# Patient Record
Sex: FEMALE | Race: WHITE | NOT HISPANIC OR LATINO | URBAN - METROPOLITAN AREA
[De-identification: names, ages, dates, MRNs, and addresses within clinical notes are randomized per-mention and may not be internally consistent; named-entity substitution may affect disease eponyms.]

---

## 2017-11-01 ENCOUNTER — OUTPATIENT (OUTPATIENT)
Dept: OUTPATIENT SERVICES | Facility: HOSPITAL | Age: 47
LOS: 1 days | End: 2017-11-01
Payer: MEDICAID

## 2017-11-07 DIAGNOSIS — R69 ILLNESS, UNSPECIFIED: ICD-10-CM

## 2018-01-19 ENCOUNTER — EMERGENCY (EMERGENCY)
Facility: HOSPITAL | Age: 48
LOS: 1 days | Discharge: HOME | End: 2018-01-19
Attending: EMERGENCY MEDICINE | Admitting: EMERGENCY MEDICINE
Payer: MEDICAID

## 2018-01-19 VITALS
HEART RATE: 75 BPM | TEMPERATURE: 98 F | DIASTOLIC BLOOD PRESSURE: 84 MMHG | WEIGHT: 136.91 LBS | SYSTOLIC BLOOD PRESSURE: 148 MMHG | RESPIRATION RATE: 16 BRPM

## 2018-01-19 VITALS
TEMPERATURE: 98 F | OXYGEN SATURATION: 100 % | DIASTOLIC BLOOD PRESSURE: 83 MMHG | SYSTOLIC BLOOD PRESSURE: 122 MMHG | HEART RATE: 70 BPM | RESPIRATION RATE: 15 BRPM

## 2018-01-19 PROCEDURE — 99283 EMERGENCY DEPT VISIT LOW MDM: CPT

## 2018-01-19 RX ORDER — ACETAMINOPHEN 500 MG
975 TABLET ORAL ONCE
Qty: 0 | Refills: 0 | Status: COMPLETED | OUTPATIENT
Start: 2018-01-19 | End: 2018-01-19

## 2018-01-19 RX ORDER — OXYCODONE HYDROCHLORIDE 5 MG/1
5 TABLET ORAL ONCE
Qty: 0 | Refills: 0 | Status: DISCONTINUED | OUTPATIENT
Start: 2018-01-19 | End: 2018-01-19

## 2018-01-19 RX ADMIN — OXYCODONE HYDROCHLORIDE 5 MILLIGRAM(S): 5 TABLET ORAL at 12:05

## 2018-01-19 RX ADMIN — Medication 975 MILLIGRAM(S): at 12:04

## 2018-01-19 RX ADMIN — OXYCODONE HYDROCHLORIDE 5 MILLIGRAM(S): 5 TABLET ORAL at 11:08

## 2018-01-19 NOTE — ED PROVIDER NOTE - MEDICAL DECISION MAKING DETAILS
48yo F h/o lymphoma in remission s/p chemo (2011), breast CA (2013) and liposarcoma (all in remission) p/w toothache x4mo. No concerns for abscess. Will c/s dental. Anticipate d/c with f/u in dental clinic for root canal. Will address pain control in ED and f/u on dental recs.

## 2018-01-19 NOTE — ED PROVIDER NOTE - OBJECTIVE STATEMENT
48yo F h/o 48yo F h/o lymphoma in remission s/p chemo (2011), breast CA (2013) and liposarcoma (all in remission) p/w toothache x4mo. Has seen multiple dentists and oral surgeons who, per pt, have refused to do root canal/tooth extraction because of pt's h/o chemo. Has been f/u by multiple PCPs/oncologists for f/u since 2013 and all BW normal. Denies f/c, HA, visual changes. Only reporting pain in R cheek, upper molar. Reports dentists told her she needs root canal. Takes tylenol for pain. Uses topical anesthetic on tooth for pain. Doesn't take nsaids 2/2 gastritis.

## 2018-01-19 NOTE — ED ADULT NURSE NOTE - OBJECTIVE STATEMENT
48 y/o female presenting to Ed c/o right side tooth pain for 4 months.  Pt states she has pmhx of lymphoma, andher oral surgeon sent her here to have teeth removed since she is "too high risk". At this time pt. complains of sharp pain. Safety and comfort measures maintained. Patient undressed and placed into gown, call bell in hand and side rails up for safety. warm blanket provided, vital signs stable, pt in no acute distress.

## 2018-01-19 NOTE — ED PROVIDER NOTE - NS ED ROS FT
Constitutional: no fevers, chills  HEENT: +toothache, no visual changes, no sore throat, no rhinorrhea  CV: no cp  Resp: no sob  GI: no abd pain, n/v, diarrhea/constipation  : no dysuria, hematuria  MSK: no joint pains  skin: no rashes  neuro: no HA, no confusion  psych: no SI/HI

## 2018-01-19 NOTE — ED PROVIDER NOTE - ATTENDING CONTRIBUTION TO CARE
attending Norma: 47yF h/o lymphoma in remission s/p chemo (2011), breast CA (2013) and liposarcoma (all in remission) p/w toothache x4mo. Reportedly seen multiple dentists and oral surgeons who, per pt, have refused to do root canal/tooth extraction because of pt's prior chemo. Denies f/c, HA, visual changes. Only reporting pain in R cheek, upper molar. No relief with tylenol. On exam, uncomfortable 2/2 pain, afebrile, tenderness to R upper molar, no gingival abscess. Will administer pain control, dental consult and reassess.

## 2018-01-19 NOTE — ED PROVIDER NOTE - PHYSICAL EXAMINATION
Vitals: WNL  Gen: sitting uncomfortably in pain  HEENT: NCAT, sclerae white, anicterus, moist mucous membranes. poor dentition with multiple missing teeth and fillings, no abscesses in oral cavity, L upper gums ttp, 3rd tooth ttp  CV: RRR. Audible S1 and S2. No murmurs, rubs, gallops, S3, nor S4, 2+ radial and DP pulses   Pulm: Clear to auscultation bilaterally. No wheezes, rales, or rhonchi  Abd: soft, normoactive BS x4, NTND, no rebound, no guarding, no rashes  Musculoskeletal:  No peripheral edema  Skin: no lesions or scars noted  Neurologic: AAOx3  : no CVA tenderness

## 2018-01-19 NOTE — ED ADULT NURSE NOTE - PMH
Anxiety Associated with Depression    Diffuse large B cell lymphoma  diagnosed 2010, with spleen and liver involvement, currenlty in remission  Diverticulosis    GERD (gastroesophageal reflux disease)    Hiatal hernia    History of cancer chemotherapy    Hypertension    Migraine with aura

## 2018-02-15 ENCOUNTER — OUTPATIENT (OUTPATIENT)
Dept: OUTPATIENT SERVICES | Facility: HOSPITAL | Age: 48
LOS: 1 days | End: 2018-02-15
Payer: MEDICAID

## 2018-02-15 ENCOUNTER — APPOINTMENT (OUTPATIENT)
Dept: RADIOLOGY | Facility: IMAGING CENTER | Age: 48
End: 2018-02-15

## 2018-02-15 DIAGNOSIS — Z00.8 ENCOUNTER FOR OTHER GENERAL EXAMINATION: ICD-10-CM

## 2018-02-15 PROCEDURE — 72110 X-RAY EXAM L-2 SPINE 4/>VWS: CPT

## 2018-02-15 PROCEDURE — 72110 X-RAY EXAM L-2 SPINE 4/>VWS: CPT | Mod: 26

## 2018-05-07 ENCOUNTER — EMERGENCY (EMERGENCY)
Facility: HOSPITAL | Age: 48
LOS: 1 days | Discharge: HOME | End: 2018-05-07
Attending: EMERGENCY MEDICINE
Payer: MEDICAID

## 2018-05-07 VITALS
SYSTOLIC BLOOD PRESSURE: 133 MMHG | RESPIRATION RATE: 18 BRPM | HEART RATE: 75 BPM | WEIGHT: 130.07 LBS | OXYGEN SATURATION: 98 % | DIASTOLIC BLOOD PRESSURE: 86 MMHG | TEMPERATURE: 98 F

## 2018-05-07 LAB
ALBUMIN SERPL ELPH-MCNC: 4.4 G/DL — SIGNIFICANT CHANGE UP (ref 3.3–5)
ALP SERPL-CCNC: 77 U/L — SIGNIFICANT CHANGE UP (ref 40–120)
ALT FLD-CCNC: 22 U/L — SIGNIFICANT CHANGE UP (ref 10–45)
ANION GAP SERPL CALC-SCNC: 16 MMOL/L — SIGNIFICANT CHANGE UP (ref 5–17)
AST SERPL-CCNC: 28 U/L — SIGNIFICANT CHANGE UP (ref 10–40)
BASOPHILS # BLD AUTO: 0.1 K/UL — SIGNIFICANT CHANGE UP (ref 0–0.2)
BASOPHILS NFR BLD AUTO: 0.6 % — SIGNIFICANT CHANGE UP (ref 0–2)
BILIRUB SERPL-MCNC: 0.4 MG/DL — SIGNIFICANT CHANGE UP (ref 0.2–1.2)
BUN SERPL-MCNC: 27 MG/DL — HIGH (ref 7–23)
CALCIUM SERPL-MCNC: 9.5 MG/DL — SIGNIFICANT CHANGE UP (ref 8.4–10.5)
CHLORIDE SERPL-SCNC: 102 MMOL/L — SIGNIFICANT CHANGE UP (ref 96–108)
CO2 SERPL-SCNC: 22 MMOL/L — SIGNIFICANT CHANGE UP (ref 22–31)
CREAT SERPL-MCNC: 0.84 MG/DL — SIGNIFICANT CHANGE UP (ref 0.5–1.3)
EOSINOPHIL # BLD AUTO: 0.1 K/UL — SIGNIFICANT CHANGE UP (ref 0–0.5)
EOSINOPHIL NFR BLD AUTO: 0.7 % — SIGNIFICANT CHANGE UP (ref 0–6)
GAS PNL BLDV: SIGNIFICANT CHANGE UP
GLUCOSE SERPL-MCNC: 78 MG/DL — SIGNIFICANT CHANGE UP (ref 70–99)
HCG SERPL QL: NEGATIVE — SIGNIFICANT CHANGE UP
HCT VFR BLD CALC: 40.1 % — SIGNIFICANT CHANGE UP (ref 34.5–45)
HGB BLD-MCNC: 13.2 G/DL — SIGNIFICANT CHANGE UP (ref 11.5–15.5)
LIDOCAIN IGE QN: 14 U/L — SIGNIFICANT CHANGE UP (ref 7–60)
LYMPHOCYTES # BLD AUTO: 2.6 K/UL — SIGNIFICANT CHANGE UP (ref 1–3.3)
LYMPHOCYTES # BLD AUTO: 25.3 % — SIGNIFICANT CHANGE UP (ref 13–44)
MCHC RBC-ENTMCNC: 28.2 PG — SIGNIFICANT CHANGE UP (ref 27–34)
MCHC RBC-ENTMCNC: 33 GM/DL — SIGNIFICANT CHANGE UP (ref 32–36)
MCV RBC AUTO: 85.4 FL — SIGNIFICANT CHANGE UP (ref 80–100)
MONOCYTES # BLD AUTO: 0.7 K/UL — SIGNIFICANT CHANGE UP (ref 0–0.9)
MONOCYTES NFR BLD AUTO: 7.3 % — SIGNIFICANT CHANGE UP (ref 2–14)
NEUTROPHILS # BLD AUTO: 6.7 K/UL — SIGNIFICANT CHANGE UP (ref 1.8–7.4)
NEUTROPHILS NFR BLD AUTO: 66.2 % — SIGNIFICANT CHANGE UP (ref 43–77)
PLATELET # BLD AUTO: 275 K/UL — SIGNIFICANT CHANGE UP (ref 150–400)
POTASSIUM SERPL-MCNC: 3.8 MMOL/L — SIGNIFICANT CHANGE UP (ref 3.5–5.3)
POTASSIUM SERPL-SCNC: 3.8 MMOL/L — SIGNIFICANT CHANGE UP (ref 3.5–5.3)
PROT SERPL-MCNC: 7.9 G/DL — SIGNIFICANT CHANGE UP (ref 6–8.3)
RBC # BLD: 4.69 M/UL — SIGNIFICANT CHANGE UP (ref 3.8–5.2)
RBC # FLD: 16.6 % — HIGH (ref 10.3–14.5)
SODIUM SERPL-SCNC: 140 MMOL/L — SIGNIFICANT CHANGE UP (ref 135–145)
WBC # BLD: 10.1 K/UL — SIGNIFICANT CHANGE UP (ref 3.8–10.5)
WBC # FLD AUTO: 10.1 K/UL — SIGNIFICANT CHANGE UP (ref 3.8–10.5)

## 2018-05-07 PROCEDURE — 99284 EMERGENCY DEPT VISIT MOD MDM: CPT

## 2018-05-07 RX ORDER — SODIUM CHLORIDE 9 MG/ML
1000 INJECTION INTRAMUSCULAR; INTRAVENOUS; SUBCUTANEOUS ONCE
Refills: 0 | Status: COMPLETED | OUTPATIENT
Start: 2018-05-07 | End: 2018-05-07

## 2018-05-07 RX ORDER — ONDANSETRON 8 MG/1
4 TABLET, FILM COATED ORAL ONCE
Refills: 0 | Status: COMPLETED | OUTPATIENT
Start: 2018-05-07 | End: 2018-05-07

## 2018-05-07 RX ORDER — FAMOTIDINE 10 MG/ML
20 INJECTION INTRAVENOUS ONCE
Refills: 0 | Status: COMPLETED | OUTPATIENT
Start: 2018-05-07 | End: 2018-05-07

## 2018-05-07 RX ADMIN — FAMOTIDINE 20 MILLIGRAM(S): 10 INJECTION INTRAVENOUS at 19:17

## 2018-05-07 RX ADMIN — ONDANSETRON 4 MILLIGRAM(S): 8 TABLET, FILM COATED ORAL at 19:17

## 2018-05-07 RX ADMIN — SODIUM CHLORIDE 1000 MILLILITER(S): 9 INJECTION INTRAMUSCULAR; INTRAVENOUS; SUBCUTANEOUS at 19:17

## 2018-05-07 NOTE — ED PROVIDER NOTE - ATTENDING CONTRIBUTION TO CARE
Patient seen with both the scribe and resident. Presenting with a constellation of complaints just like those she has been experiencing for years. Physical examination benign. Screening labs obtained for objective measure while providing medication in an effort to promote symptomatic improvement/relief and a PO challenge. Labs unremarkable. Tolerated PO. There is nothing clinically evident to suggest any acute or emergent process; e.g., obstruction, perforation, ischemia, Ao catastrophe, torsion, "-itis" in need of imaging or extra-abdominal issue, be it supra-diaphragmatic or retroperitoneal. There is no clinical indication for any emergent radiographic investigation The patient has been discharged with appropriate instruction and follow-up.

## 2018-05-07 NOTE — ED ADULT NURSE NOTE - OBJECTIVE STATEMENT
Pt is a 46 yo female with the co N/V since saturday. Pt states that she went out to dinner with her  on saturday and shortly after she began to feel nausea and felt a burning sensation in the epigastric area, Pt denies being able to tolerate anything since. She denies any CP or SOB no cough fever or chills. Pt has a pmh of GERD, hiatal hernia, sarcoma, carcinoma (right leg), lymphoma, PSHx of mastectomy

## 2018-05-07 NOTE — ED PROVIDER NOTE - CARE PLAN
Principal Discharge DX:	Abdominal pain  Assessment and plan of treatment:	You have abdominal pain, that you have had off-and-on and does not appear to be different. The primary source is likely your chronic gastritis and reflux irritation. Your labs do not show anything more serious, and your pain improved with fluids and medicines to decrease acid. There is no sign of anemia. Please follow up with your Primary Care Provider (PCP) this week to monitor your symptoms.

## 2018-05-07 NOTE — ED PROVIDER NOTE - OBJECTIVE STATEMENT
48 y/o F pt with PMHx of GERD, hiatal hernia, sarcoma, carcinoma (right leg), lymphoma, PSHx of mastectomy c/o intermittent nausea vomiting with associated abd pain x3 days. States that sx started with nausea and then noted abd pain due to vomiting. Also notes decreased eating/drinking. States that she has multiple episodes of vomiting fits but still haven't been able to Dx. Pt has hx of endoscopy with no findings. Current medication; Zofran, Prilosec Patient was previously under different name: Bailee Og  46 y/o F pt with PMHx of GERD, hiatal hernia, sarcoma, carcinoma (right leg), chronic hemorrhoids, lymphoma, PSHx of mastectomy c/o intermittent nausea vomiting with associated abd pain x3 days. States that sx started with nausea and then noted abd pain due to vomiting. Also notes decreased eating/drinking. Sxs started after drinking 3 glasses of wine Saturday night. States that she has multiple episodes of vomiting fits but still haven't been able to Dx. Pt has hx of endoscopy, capsule, and colonoscopy with no findings. Chronic BRBPR from hemorrhoids, unchanged. Current medication; Zofran, Prilosec. Current 1/2 ppd smoker, formerly 2 ppd.

## 2018-05-07 NOTE — ED PROVIDER NOTE - MEDICAL DECISION MAKING DETAILS
46 y/o F pt with PMHx of GERD, hiatal hernia, sarcoma, carcinoma (right leg), chronic hemorrhoids, lymphoma, PSHx of mastectomy c/o intermittent nausea vomiting with associated abd pain x3 days. 48 y/o F pt with PMHx of GERD, hiatal hernia, sarcoma, carcinoma (right leg), chronic hemorrhoids, lymphoma, PSHx of mastectomy c/o intermittent nausea vomiting with associated abd pain x3 days. Patient's symptoms are not different from prior admissions, nontoxic appearing with benign abdominal exam, no concern for infection. Pt has h/o gastritis, will give Pepcid. Ddx includes pregnancy, LMP 2 months ago. Antiemetics, IVFs, CBC, check lytes, lactate. No urinary complaints. MAYRA.

## 2018-05-07 NOTE — ED ADULT NURSE REASSESSMENT NOTE - NS ED NURSE REASSESS COMMENT FT1
Pt says she is feeling better. Decreased nausea and abdominal pain. Able to tolerate PO. Will reassess shortly for DC.

## 2018-05-07 NOTE — ED PROVIDER NOTE - PLAN OF CARE
You have abdominal pain, that you have had off-and-on and does not appear to be different. The primary source is likely your chronic gastritis and reflux irritation. Your labs do not show anything more serious, and your pain improved with fluids and medicines to decrease acid. There is no sign of anemia. Please follow up with your Primary Care Provider (PCP) this week to monitor your symptoms.

## 2018-05-07 NOTE — ED PROVIDER NOTE - PROGRESS NOTE DETAILS
Patient feeling much better after IVF, Zofran, Pepcid. Still waiting to urinate. If UA/preg nml, can be d'cd. Patient feeling much better after IVF, Zofran, Pepcid. Tolerating PO challenge, can be dcd.

## 2018-05-08 DIAGNOSIS — Z90.13 ACQUIRED ABSENCE OF BILATERAL BREASTS AND NIPPLES: Chronic | ICD-10-CM

## 2018-05-31 ENCOUNTER — TRANSCRIPTION ENCOUNTER (OUTPATIENT)
Age: 48
End: 2018-05-31

## 2018-06-01 ENCOUNTER — INPATIENT (INPATIENT)
Facility: HOSPITAL | Age: 48
LOS: 1 days | Discharge: HOME | DRG: 343 | End: 2018-06-03
Attending: SURGERY | Admitting: SURGERY
Payer: MEDICAID

## 2018-06-01 ENCOUNTER — RESULT REVIEW (OUTPATIENT)
Age: 48
End: 2018-06-01

## 2018-06-01 VITALS
RESPIRATION RATE: 18 BRPM | SYSTOLIC BLOOD PRESSURE: 116 MMHG | HEART RATE: 99 BPM | DIASTOLIC BLOOD PRESSURE: 64 MMHG | WEIGHT: 147.93 LBS | OXYGEN SATURATION: 98 % | TEMPERATURE: 98 F

## 2018-06-01 DIAGNOSIS — Z90.13 ACQUIRED ABSENCE OF BILATERAL BREASTS AND NIPPLES: Chronic | ICD-10-CM

## 2018-06-01 DIAGNOSIS — K35.80 UNSPECIFIED ACUTE APPENDICITIS: ICD-10-CM

## 2018-06-01 DIAGNOSIS — Z85.830 PERSONAL HISTORY OF MALIGNANT NEOPLASM OF BONE: Chronic | ICD-10-CM

## 2018-06-01 LAB
ALBUMIN SERPL ELPH-MCNC: 4 G/DL — SIGNIFICANT CHANGE UP (ref 3.3–5)
ALP SERPL-CCNC: 80 U/L — SIGNIFICANT CHANGE UP (ref 40–120)
ALT FLD-CCNC: 16 U/L — SIGNIFICANT CHANGE UP (ref 10–45)
ANION GAP SERPL CALC-SCNC: 13 MMOL/L — SIGNIFICANT CHANGE UP (ref 5–17)
APPEARANCE UR: CLEAR — SIGNIFICANT CHANGE UP
APTT BLD: 29.2 SEC — SIGNIFICANT CHANGE UP (ref 27.5–37.4)
AST SERPL-CCNC: 16 U/L — SIGNIFICANT CHANGE UP (ref 10–40)
BASOPHILS # BLD AUTO: 0.1 K/UL — SIGNIFICANT CHANGE UP (ref 0–0.2)
BASOPHILS NFR BLD AUTO: 0.4 % — SIGNIFICANT CHANGE UP (ref 0–2)
BILIRUB SERPL-MCNC: 0.4 MG/DL — SIGNIFICANT CHANGE UP (ref 0.2–1.2)
BILIRUB UR-MCNC: NEGATIVE — SIGNIFICANT CHANGE UP
BLD GP AB SCN SERPL QL: NEGATIVE — SIGNIFICANT CHANGE UP
BUN SERPL-MCNC: 15 MG/DL — SIGNIFICANT CHANGE UP (ref 7–23)
CALCIUM SERPL-MCNC: 9.3 MG/DL — SIGNIFICANT CHANGE UP (ref 8.4–10.5)
CHLORIDE SERPL-SCNC: 102 MMOL/L — SIGNIFICANT CHANGE UP (ref 96–108)
CO2 SERPL-SCNC: 21 MMOL/L — LOW (ref 22–31)
COLOR SPEC: COLORLESS — SIGNIFICANT CHANGE UP
CREAT SERPL-MCNC: 0.7 MG/DL — SIGNIFICANT CHANGE UP (ref 0.5–1.3)
DIFF PNL FLD: NEGATIVE — SIGNIFICANT CHANGE UP
EOSINOPHIL # BLD AUTO: 0.1 K/UL — SIGNIFICANT CHANGE UP (ref 0–0.5)
EOSINOPHIL NFR BLD AUTO: 0.9 % — SIGNIFICANT CHANGE UP (ref 0–6)
GLUCOSE SERPL-MCNC: 88 MG/DL — SIGNIFICANT CHANGE UP (ref 70–99)
GLUCOSE UR QL: NEGATIVE — SIGNIFICANT CHANGE UP
HCG SERPL-ACNC: <2 MIU/ML — SIGNIFICANT CHANGE UP (ref 5–24)
HCT VFR BLD CALC: 37.9 % — SIGNIFICANT CHANGE UP (ref 34.5–45)
HGB BLD-MCNC: 12.9 G/DL — SIGNIFICANT CHANGE UP (ref 11.5–15.5)
INR BLD: 1.16 RATIO — SIGNIFICANT CHANGE UP (ref 0.88–1.16)
KETONES UR-MCNC: NEGATIVE — SIGNIFICANT CHANGE UP
LEUKOCYTE ESTERASE UR-ACNC: NEGATIVE — SIGNIFICANT CHANGE UP
LIDOCAIN IGE QN: 18 U/L — SIGNIFICANT CHANGE UP (ref 7–60)
LYMPHOCYTES # BLD AUTO: 19.2 % — SIGNIFICANT CHANGE UP (ref 13–44)
LYMPHOCYTES # BLD AUTO: 2.4 K/UL — SIGNIFICANT CHANGE UP (ref 1–3.3)
MCHC RBC-ENTMCNC: 29.2 PG — SIGNIFICANT CHANGE UP (ref 27–34)
MCHC RBC-ENTMCNC: 34 GM/DL — SIGNIFICANT CHANGE UP (ref 32–36)
MCV RBC AUTO: 86 FL — SIGNIFICANT CHANGE UP (ref 80–100)
MONOCYTES # BLD AUTO: 0.5 K/UL — SIGNIFICANT CHANGE UP (ref 0–0.9)
MONOCYTES NFR BLD AUTO: 4.3 % — SIGNIFICANT CHANGE UP (ref 2–14)
NEUTROPHILS # BLD AUTO: 9.4 K/UL — HIGH (ref 1.8–7.4)
NEUTROPHILS NFR BLD AUTO: 75.1 % — SIGNIFICANT CHANGE UP (ref 43–77)
NITRITE UR-MCNC: NEGATIVE — SIGNIFICANT CHANGE UP
PH UR: 6.5 — SIGNIFICANT CHANGE UP (ref 5–8)
PLATELET # BLD AUTO: 240 K/UL — SIGNIFICANT CHANGE UP (ref 150–400)
POTASSIUM SERPL-MCNC: 4.1 MMOL/L — SIGNIFICANT CHANGE UP (ref 3.5–5.3)
POTASSIUM SERPL-SCNC: 4.1 MMOL/L — SIGNIFICANT CHANGE UP (ref 3.5–5.3)
PROT SERPL-MCNC: 7.1 G/DL — SIGNIFICANT CHANGE UP (ref 6–8.3)
PROT UR-MCNC: NEGATIVE — SIGNIFICANT CHANGE UP
PROTHROM AB SERPL-ACNC: 12.6 SEC — SIGNIFICANT CHANGE UP (ref 9.8–12.7)
RBC # BLD: 4.41 M/UL — SIGNIFICANT CHANGE UP (ref 3.8–5.2)
RBC # FLD: 16.4 % — HIGH (ref 10.3–14.5)
RH IG SCN BLD-IMP: POSITIVE — SIGNIFICANT CHANGE UP
SODIUM SERPL-SCNC: 136 MMOL/L — SIGNIFICANT CHANGE UP (ref 135–145)
SP GR SPEC: <1.005 — LOW (ref 1.01–1.02)
UROBILINOGEN FLD QL: NEGATIVE — SIGNIFICANT CHANGE UP
WBC # BLD: 12.6 K/UL — HIGH (ref 3.8–10.5)
WBC # FLD AUTO: 12.6 K/UL — HIGH (ref 3.8–10.5)

## 2018-06-01 PROCEDURE — 99285 EMERGENCY DEPT VISIT HI MDM: CPT

## 2018-06-01 PROCEDURE — 93010 ELECTROCARDIOGRAM REPORT: CPT

## 2018-06-01 PROCEDURE — 76830 TRANSVAGINAL US NON-OB: CPT | Mod: 26

## 2018-06-01 PROCEDURE — 88304 TISSUE EXAM BY PATHOLOGIST: CPT | Mod: 26

## 2018-06-01 PROCEDURE — 93975 VASCULAR STUDY: CPT | Mod: 26

## 2018-06-01 PROCEDURE — 74177 CT ABD & PELVIS W/CONTRAST: CPT | Mod: 26

## 2018-06-01 PROCEDURE — 44970 LAPAROSCOPY APPENDECTOMY: CPT

## 2018-06-01 PROCEDURE — 99222 1ST HOSP IP/OBS MODERATE 55: CPT | Mod: 57

## 2018-06-01 RX ORDER — MORPHINE SULFATE 50 MG/1
4 CAPSULE, EXTENDED RELEASE ORAL ONCE
Refills: 0 | Status: DISCONTINUED | OUTPATIENT
Start: 2018-06-01 | End: 2018-06-01

## 2018-06-01 RX ORDER — PIPERACILLIN AND TAZOBACTAM 4; .5 G/20ML; G/20ML
3.38 INJECTION, POWDER, LYOPHILIZED, FOR SOLUTION INTRAVENOUS ONCE
Refills: 0 | Status: COMPLETED | OUTPATIENT
Start: 2018-06-01 | End: 2018-06-01

## 2018-06-01 RX ORDER — HYDROMORPHONE HYDROCHLORIDE 2 MG/ML
0.5 INJECTION INTRAMUSCULAR; INTRAVENOUS; SUBCUTANEOUS
Refills: 0 | Status: DISCONTINUED | OUTPATIENT
Start: 2018-06-01 | End: 2018-06-02

## 2018-06-01 RX ORDER — HYDROMORPHONE HYDROCHLORIDE 2 MG/ML
1 INJECTION INTRAMUSCULAR; INTRAVENOUS; SUBCUTANEOUS ONCE
Refills: 0 | Status: DISCONTINUED | OUTPATIENT
Start: 2018-06-01 | End: 2018-06-01

## 2018-06-01 RX ORDER — MORPHINE SULFATE 50 MG/1
4 CAPSULE, EXTENDED RELEASE ORAL EVERY 4 HOURS
Refills: 0 | Status: DISCONTINUED | OUTPATIENT
Start: 2018-06-01 | End: 2018-06-01

## 2018-06-01 RX ORDER — SODIUM CHLORIDE 9 MG/ML
1000 INJECTION, SOLUTION INTRAVENOUS
Refills: 0 | Status: DISCONTINUED | OUTPATIENT
Start: 2018-06-01 | End: 2018-06-03

## 2018-06-01 RX ORDER — SODIUM CHLORIDE 9 MG/ML
1000 INJECTION INTRAMUSCULAR; INTRAVENOUS; SUBCUTANEOUS ONCE
Refills: 0 | Status: COMPLETED | OUTPATIENT
Start: 2018-06-01 | End: 2018-06-01

## 2018-06-01 RX ORDER — SODIUM CHLORIDE 9 MG/ML
1000 INJECTION, SOLUTION INTRAVENOUS
Refills: 0 | Status: DISCONTINUED | OUTPATIENT
Start: 2018-06-01 | End: 2018-06-01

## 2018-06-01 RX ORDER — ONDANSETRON 8 MG/1
4 TABLET, FILM COATED ORAL ONCE
Refills: 0 | Status: DISCONTINUED | OUTPATIENT
Start: 2018-06-01 | End: 2018-06-02

## 2018-06-01 RX ORDER — DIPHENHYDRAMINE HCL 50 MG
25 CAPSULE ORAL EVERY 4 HOURS
Refills: 0 | Status: DISCONTINUED | OUTPATIENT
Start: 2018-06-01 | End: 2018-06-01

## 2018-06-01 RX ORDER — MORPHINE SULFATE 50 MG/1
2 CAPSULE, EXTENDED RELEASE ORAL EVERY 4 HOURS
Refills: 0 | Status: DISCONTINUED | OUTPATIENT
Start: 2018-06-01 | End: 2018-06-01

## 2018-06-01 RX ORDER — OXYCODONE AND ACETAMINOPHEN 5; 325 MG/1; MG/1
1 TABLET ORAL EVERY 4 HOURS
Refills: 0 | Status: DISCONTINUED | OUTPATIENT
Start: 2018-06-01 | End: 2018-06-03

## 2018-06-01 RX ORDER — DIPHENHYDRAMINE HCL 50 MG
25 CAPSULE ORAL ONCE
Refills: 0 | Status: COMPLETED | OUTPATIENT
Start: 2018-06-01 | End: 2018-06-01

## 2018-06-01 RX ORDER — DOCUSATE SODIUM 100 MG
100 CAPSULE ORAL THREE TIMES A DAY
Refills: 0 | Status: DISCONTINUED | OUTPATIENT
Start: 2018-06-01 | End: 2018-06-03

## 2018-06-01 RX ORDER — ONDANSETRON 8 MG/1
4 TABLET, FILM COATED ORAL ONCE
Refills: 0 | Status: COMPLETED | OUTPATIENT
Start: 2018-06-01 | End: 2018-06-01

## 2018-06-01 RX ORDER — OXYCODONE AND ACETAMINOPHEN 5; 325 MG/1; MG/1
2 TABLET ORAL EVERY 6 HOURS
Refills: 0 | Status: DISCONTINUED | OUTPATIENT
Start: 2018-06-01 | End: 2018-06-03

## 2018-06-01 RX ORDER — PIPERACILLIN AND TAZOBACTAM 4; .5 G/20ML; G/20ML
3.38 INJECTION, POWDER, LYOPHILIZED, FOR SOLUTION INTRAVENOUS EVERY 8 HOURS
Refills: 0 | Status: DISCONTINUED | OUTPATIENT
Start: 2018-06-01 | End: 2018-06-01

## 2018-06-01 RX ORDER — MORPHINE SULFATE 50 MG/1
2 CAPSULE, EXTENDED RELEASE ORAL EVERY 6 HOURS
Refills: 0 | Status: DISCONTINUED | OUTPATIENT
Start: 2018-06-01 | End: 2018-06-03

## 2018-06-01 RX ORDER — FAMOTIDINE 10 MG/ML
20 INJECTION INTRAVENOUS ONCE
Refills: 0 | Status: COMPLETED | OUTPATIENT
Start: 2018-06-01 | End: 2018-06-01

## 2018-06-01 RX ADMIN — HYDROMORPHONE HYDROCHLORIDE 0.5 MILLIGRAM(S): 2 INJECTION INTRAMUSCULAR; INTRAVENOUS; SUBCUTANEOUS at 23:51

## 2018-06-01 RX ADMIN — SODIUM CHLORIDE 1000 MILLILITER(S): 9 INJECTION INTRAMUSCULAR; INTRAVENOUS; SUBCUTANEOUS at 21:07

## 2018-06-01 RX ADMIN — PIPERACILLIN AND TAZOBACTAM 200 GRAM(S): 4; .5 INJECTION, POWDER, LYOPHILIZED, FOR SOLUTION INTRAVENOUS at 15:34

## 2018-06-01 RX ADMIN — SODIUM CHLORIDE 1000 MILLILITER(S): 9 INJECTION INTRAMUSCULAR; INTRAVENOUS; SUBCUTANEOUS at 13:47

## 2018-06-01 RX ADMIN — ONDANSETRON 4 MILLIGRAM(S): 8 TABLET, FILM COATED ORAL at 13:46

## 2018-06-01 RX ADMIN — HYDROMORPHONE HYDROCHLORIDE 1 MILLIGRAM(S): 2 INJECTION INTRAMUSCULAR; INTRAVENOUS; SUBCUTANEOUS at 20:11

## 2018-06-01 RX ADMIN — MORPHINE SULFATE 4 MILLIGRAM(S): 50 CAPSULE, EXTENDED RELEASE ORAL at 15:41

## 2018-06-01 RX ADMIN — MORPHINE SULFATE 4 MILLIGRAM(S): 50 CAPSULE, EXTENDED RELEASE ORAL at 13:46

## 2018-06-01 RX ADMIN — Medication 25 MILLIGRAM(S): at 21:22

## 2018-06-01 RX ADMIN — FAMOTIDINE 20 MILLIGRAM(S): 10 INJECTION INTRAVENOUS at 13:46

## 2018-06-01 RX ADMIN — Medication 25 MILLIGRAM(S): at 13:46

## 2018-06-01 NOTE — H&P ADULT - ASSESSMENT
46y/o female with acute uncomplicated appendicitis    - Admit to [facility] service  - NPO with IVF & IV Zosyn  - Pain control  - Plan for OR tonight  - D/w Dr. Reynoso

## 2018-06-01 NOTE — H&P ADULT - PMH
Breast cancer  lymph nodes removal in left  Carcinoma    Depression    GERD (gastroesophageal reflux disease)    Hiatal hernia    Lymphoma  S/p chemotherapy  Sarcoma

## 2018-06-01 NOTE — ED ADULT NURSE NOTE - OBJECTIVE STATEMENT
Pt is a 48 yo F who came to the ED amb c/o RLQ pain rad to back x 3 days. Abd soft, mild tenderness rlq, + chills and nausea, no fevers, no vomiting/diarrhea, no change in bowel/urine habits. Also c/o itchy burning rash to thighs. A/O x3, redness to thighs R > L, no drainage.

## 2018-06-01 NOTE — H&P ADULT - NSHPPHYSICALEXAM_GEN_ALL_CORE
General: alert and oriented, NAD  Resp: airway patent, respirations unlabored  CVS: regular rate and rhythm  Abdomen: soft,  nondistended with focal RLQ TTP over McBurney's point.  Extremities: no edema  Skin: warm, dry, appropriate color

## 2018-06-01 NOTE — ED PROVIDER NOTE - MEDICAL DECISION MAKING DETAILS
48 yo F smoker with pmhx sarcoma, lymphoma(chemo 2011), and breast ca presenting with rlq pain and rash. IVF/morphine/zofran/pepcid/benadryl/bw/ua/us/ct/reasses 46 yo F smoker with pmhx sarcoma, lymphoma(chemo 2011), and breast ca presenting with rlq pain and rash. IVF/morphine/zofran/pepcid/benadryl/bw/ua/us/ct/reasses  [place]: See attending statement below

## 2018-06-01 NOTE — BRIEF OPERATIVE NOTE - PROCEDURE
<<-----Click on this checkbox to enter Procedure Laparoscopic appendectomy  06/01/2018    Active  [id number]

## 2018-06-01 NOTE — ED ADULT NURSE REASSESSMENT NOTE - NS ED NURSE REASSESS COMMENT FT1
+pain relief, surgery resident at bedside, adm pending
awaiting CT abd and surgery resident, adm pending
pt at 
surgery resident at bedside, adm pending
US:+appy, medicated for pain, abx infusing, T&S sent, vitals stable, denies other c/o, adm pending
amb to/from bathroom with family assisting, awiating adm

## 2018-06-01 NOTE — H&P ADULT - ATTENDING COMMENTS
Patient seen and examined  Signs / symptoms / imaging consistent with acute appendicitis  Risks / benefits / alternatives to laparoscopic, possible open, appendectomy discussed with patient  All questions answered  Patient agrees to proceed to OR

## 2018-06-01 NOTE — ED PROVIDER NOTE - CHPI ED SYMPTOMS NEG
no abdominal distension/no blood in stool/no diarrhea/no dysuria/no hematuria/no vomiting/no burning urination

## 2018-06-01 NOTE — ED ADULT NURSE NOTE - PMH
Carcinoma    GERD (gastroesophageal reflux disease)    Hiatal hernia    Lymphoma    Sarcoma Breast cancer  lymph nodes removal in left  Carcinoma    Depression    GERD (gastroesophageal reflux disease)    Hiatal hernia    Lymphoma    Sarcoma

## 2018-06-01 NOTE — H&P ADULT - HISTORY OF PRESENT ILLNESS
48y/o female with h/o Lymphoma, LLE osteosarcoma (s/p chemotherapy), & breast cancer s/p B/L mastectomy & tissue expander/implant reconstruction now p/w 3 days of progressively worsening RLQ abdominal pain. She reports associated nausea & subjective fevers. She denies emesis, diarrhea/constipation. On exam she has focal RLQ TTP over McBurney's point. WBC = 12.6  CT abd/pelvis shows acute uncomplicated appendicitis

## 2018-06-01 NOTE — H&P ADULT - NSHPLABSRESULTS_GEN_ALL_CORE
Imaging:   < from: CT Abdomen and Pelvis w/ Oral Cont and w/ IV Cont (06.01.18 @ 18:31) >      IMPRESSION:  Acute appendicitis without evidence of complication.    < end of copied text >

## 2018-06-01 NOTE — ED PROVIDER NOTE - ATTENDING CONTRIBUTION TO CARE
47y F hx of sarcoma, breast CA, lymphoma (all in remission) here from PCP office for C/o RLQ pain. Pt states pain started 3 days ago, sudden onset after coughing however pain has persisted and intensified since then, no longer sharp, but not dull or aching either, pt states difficult to describe. LMP was months ago, states this is not always the case. No vaginal DC. No urinary sx. +Nausea. No vomiting. Dec PO intake. No fever. +Chills. Pt also notes development of rash to BL LE on upper outer thighs, started as small red bumps and then after scratching became larger and swollen. Does not recall any insect bites but was spending time outside recently.   Gen: WNWD NAD  HEENT: NCAT PERRL EOMI normal pharynx  Neck: supple  CV: RRR, no murmur  Lung: CTA BL  Abd: +BS soft ND RLQ TTP w vol guarding.   Pelvic: See PA note.  Ext: wwp, palp pulses, FROMx4, no cce  Skin: Erythematous raised lesion to R upper outer thigh w small red well circumscribed swollen bumps surrounding. L upper outer thigh also with small red well circumscribed swollen bumps  Neuro: A&Ox3, CN grossly intact, sensation intact, motor 5/5 throughout  AP: 47y F hx of sarcoma, breast CA, lymphoma (all in remission) here from PCP office for C/o RLQ pain x3 days. Eval for ovarian pathology, appy, colitis, UTI, ectopic. Also w rash to BL thighs appears urticarial. Will tx with antihistamines. Re-eval.

## 2018-06-01 NOTE — ED PROVIDER NOTE - OBJECTIVE STATEMENT
48 yo F smoker with pmhx sarcoma, lymphoma(chemo 2011), and breast ca presenting with rlq pain x three days. Patient states she has been having constant rlq pain worse with movement for the past three days. Patient has associated nausea with the pain. Patient hasn't tried anything for the pain  and admits to normal bm. Patient states she has also been having a pruritic rash to the right lower extremity which is now spreading to the left lower extremity. Patient denies cp, sob, tingling, numbness, weakness, fever, vomiting, diarrhea, melena, constipation, brbpr, ha, sore throat, throat swelling, dysuria, hematuria, and vaginal discharge. Patient went to pcp and sent here

## 2018-06-02 ENCOUNTER — TRANSCRIPTION ENCOUNTER (OUTPATIENT)
Age: 48
End: 2018-06-02

## 2018-06-02 LAB
CULTURE RESULTS: NO GROWTH — SIGNIFICANT CHANGE UP
SPECIMEN SOURCE: SIGNIFICANT CHANGE UP

## 2018-06-02 PROCEDURE — 99024 POSTOP FOLLOW-UP VISIT: CPT

## 2018-06-02 RX ORDER — ESCITALOPRAM OXALATE 10 MG/1
20 TABLET, FILM COATED ORAL DAILY
Refills: 0 | Status: DISCONTINUED | OUTPATIENT
Start: 2018-06-02 | End: 2018-06-03

## 2018-06-02 RX ORDER — PANTOPRAZOLE SODIUM 20 MG/1
40 TABLET, DELAYED RELEASE ORAL
Refills: 0 | Status: DISCONTINUED | OUTPATIENT
Start: 2018-06-02 | End: 2018-06-03

## 2018-06-02 RX ORDER — OXYCODONE AND ACETAMINOPHEN 5; 325 MG/1; MG/1
1 TABLET ORAL
Qty: 24 | Refills: 0
Start: 2018-06-02 | End: 2018-06-05

## 2018-06-02 RX ORDER — ONDANSETRON 8 MG/1
4 TABLET, FILM COATED ORAL EVERY 6 HOURS
Refills: 0 | Status: DISCONTINUED | OUTPATIENT
Start: 2018-06-02 | End: 2018-06-03

## 2018-06-02 RX ORDER — VALSARTAN 80 MG/1
80 TABLET ORAL DAILY
Refills: 0 | Status: DISCONTINUED | OUTPATIENT
Start: 2018-06-02 | End: 2018-06-03

## 2018-06-02 RX ADMIN — OXYCODONE AND ACETAMINOPHEN 2 TABLET(S): 5; 325 TABLET ORAL at 14:02

## 2018-06-02 RX ADMIN — OXYCODONE AND ACETAMINOPHEN 2 TABLET(S): 5; 325 TABLET ORAL at 19:55

## 2018-06-02 RX ADMIN — OXYCODONE AND ACETAMINOPHEN 2 TABLET(S): 5; 325 TABLET ORAL at 07:36

## 2018-06-02 RX ADMIN — OXYCODONE AND ACETAMINOPHEN 1 TABLET(S): 5; 325 TABLET ORAL at 03:20

## 2018-06-02 RX ADMIN — ONDANSETRON 4 MILLIGRAM(S): 8 TABLET, FILM COATED ORAL at 18:36

## 2018-06-02 RX ADMIN — PANTOPRAZOLE SODIUM 40 MILLIGRAM(S): 20 TABLET, DELAYED RELEASE ORAL at 09:24

## 2018-06-02 RX ADMIN — ESCITALOPRAM OXALATE 20 MILLIGRAM(S): 10 TABLET, FILM COATED ORAL at 18:35

## 2018-06-02 RX ADMIN — ONDANSETRON 4 MILLIGRAM(S): 8 TABLET, FILM COATED ORAL at 09:24

## 2018-06-02 RX ADMIN — OXYCODONE AND ACETAMINOPHEN 2 TABLET(S): 5; 325 TABLET ORAL at 13:32

## 2018-06-02 RX ADMIN — VALSARTAN 80 MILLIGRAM(S): 80 TABLET ORAL at 19:46

## 2018-06-02 RX ADMIN — Medication 100 MILLIGRAM(S): at 13:33

## 2018-06-02 RX ADMIN — Medication 100 MILLIGRAM(S): at 07:36

## 2018-06-02 RX ADMIN — OXYCODONE AND ACETAMINOPHEN 1 TABLET(S): 5; 325 TABLET ORAL at 03:50

## 2018-06-02 RX ADMIN — OXYCODONE AND ACETAMINOPHEN 2 TABLET(S): 5; 325 TABLET ORAL at 08:36

## 2018-06-02 NOTE — DISCHARGE NOTE ADULT - CARE PROVIDER_API CALL
Efrem Reynoso), Surgery; Surgical Critical Care  [address] [place] [address]  Phone: [phone]  Fax: [phone]

## 2018-06-02 NOTE — DISCHARGE NOTE ADULT - MEDICATION SUMMARY - MEDICATIONS TO TAKE
I will START or STAY ON the medications listed below when I get home from the hospital:    diovan  -- Indication: For Blood pressure    prilosec  -- Indication: For reflux    Zofran  -- Indication: For nausea    oxyCODONE-acetaminophen 5 mg-325 mg oral tablet  -- 1-2 tab(s) by mouth every 4-6 hours, As needed, Moderate to Severe Pain MDD:6  -- Indication: For pain    Tylenol 325 mg oral capsule  -- 2 tab(s) by mouth every 4 hours, As Needed - for mild pain  -- Indication: For pain I will START or STAY ON the medications listed below when I get home from the hospital:    oxyCODONE-acetaminophen 5 mg-325 mg oral tablet  -- 1-2 tab(s) by mouth every 4-6 hours, As needed, Moderate to Severe Pain MDD:6  -- Indication: For pain    Tylenol 325 mg oral capsule  -- 2 tab(s) by mouth every 4 hours, As Needed - for mild pain  -- Indication: For pain    Diovan 80 mg oral capsule  -- 1 cap(s) by mouth once a day  -- Indication: For Blood pressure    Zofran ODT 4 mg oral tablet, disintegrating  -- 1 tab(s) by mouth 3 times a day  -- Indication: For nausea    PriLOSEC 20 mg oral delayed release capsule  -- 1 cap(s) by mouth once a day  -- Indication: For reflux I will START or STAY ON the medications listed below when I get home from the hospital:    Tylenol 325 mg oral capsule  -- 2 tab(s) by mouth every 4 hours, As Needed - for mild pain  -- Indication: For pain    oxyCODONE-acetaminophen 5 mg-325 mg oral tablet  -- 1-2 tab(s) by mouth every 4-6 hours, As needed, Moderate to Severe Pain MDD:6  -- Indication: For pain     Diovan 80 mg oral capsule  -- 1 cap(s) by mouth once a day  -- Indication: For Blood pressure    Zofran ODT 4 mg oral tablet, disintegrating  -- 1 tab(s) by mouth 3 times a day  -- Indication: For nausea    PriLOSEC 20 mg oral delayed release capsule  -- 1 cap(s) by mouth once a day  -- Indication: For reflux

## 2018-06-02 NOTE — DISCHARGE NOTE ADULT - HOSPITAL COURSE
46y/o female with h/o Lymphoma, LLE osteosarcoma (s/p chemotherapy), & breast cancer s/p B/L mastectomy & tissue expander/implant reconstruction now p/w 3 days of progressively worsening RLQ abdominal pain. She reports associated nausea & subjective fevers. She denies emesis, diarrhea/constipation. On exam she has focal RLQ TTP over McBurney's point. WBC = 12.6  CT abd/pelvis shows acute uncomplicated appendicitis    Pt underwent laparoscopic appendectomy on 6/1.  Pt. tolerated procedure well and was transferred to the recovery room and then the floor without incidence.  Pt. was mainly managed for postoperative pain, wound care, as well as close monitoring of fluid resuscitation and return of GI function.  Pt has been tolerating a diet, voiding, ambulating, and the pain is now well controlled.   Pt. is ready for discharge home in stable condition, and will follow up within one to two weeks as an outpatient with Dr. Reynoso.

## 2018-06-02 NOTE — DISCHARGE NOTE ADULT - PLAN OF CARE
Dr. Reynoso, Acute Care Surgery in 7-10 days.  Call [phone] for appointment. Notify your surgeon and return to ER for temperatures greater than 101, chills sweats, pain not controlled with pain medications, persistent nausea and vomiting, or acutely concerning matters to you, that may require urgent medical attention.  Please keep incision sites clean and dry, shower only.  Do not bathe or immerse incision sites in water for a prolonged amount of time.  Steri-strips may fall of on their own in the shower. post operative pain control, tolerate diet, local surgical incision wound care Please follow up with your Primary Care Physician regarding your hospitalization, and schedule an appointment within two weeks after your discharge to review your hospital course.  Please  review all your current medications, and dose adjust as prescribed by your Primary Care Physician.  Call their office for appointment. follow up

## 2018-06-02 NOTE — DISCHARGE NOTE ADULT - INSTRUCTIONS
Regular diet  Activity as tolerated. Avoid heavy lifting, no heavy exercise  or straining over 15 lbs for the next two weeks;  Driving- Please do not drive until your pain is well controlled and you do not need to take pain medications.  You may shower-Do not submerge or scrub incision sites.  Please pat dry incisions/dressings.  Leave the white steri strips in place, they will fall off on their own in approximately 5-7 days.

## 2018-06-02 NOTE — DISCHARGE NOTE ADULT - PATIENT PORTAL LINK FT
You can access the [facility] Patient Portal, offered by [facility], by registering with the following website: [email or web address]

## 2018-06-02 NOTE — CHART NOTE - NSCHARTNOTEFT_GEN_A_CORE
ACS Progress NOTE    S: AVSS. Tolerating regular diet, pain well controlled       O:     PHYSICAL EXAM:      General: NAD    Respiratory: Nonlabored breathing, b/l CTA    Gastrointestinal: Soft, NTTP, ND, no guarding, incision C/D/I    Extremities: WWP    Neurological: alert, non-focal deficits, NATHANIEL basurto     06-01 @ 07:01  -  06-02 @ 07:00  --------------------------------------------------------  IN: 1700 mL / OUT: 850 mL / NET: 850 mL      ICU Vital Signs Last 24 Hrs  T(C): 36.9 (02 Jun 2018 10:05), Max: 37.1 (02 Jun 2018 02:45)  T(F): 98.5 (02 Jun 2018 10:05), Max: 98.7 (02 Jun 2018 02:45)  HR: 84 (02 Jun 2018 10:05) (69 - 84)  BP: 111/70 (02 Jun 2018 10:05) (110/66 - 153/96)  BP(mean): 97 (02 Jun 2018 01:30) (83 - 106)  ABP: --  ABP(mean): --  RR: 18 (02 Jun 2018 10:05) (14 - 22)  SpO2: 96% (02 Jun 2018 10:05) (92% - 99%)          Assessment and Plan:     46y/o female with acute uncomplicated appendicitis s/p lap appendectomy     - regular diet  - Pain control  - discharge today     [id number]

## 2018-06-02 NOTE — DISCHARGE NOTE ADULT - CARE PLAN
Principal Discharge DX:	Acute appendicitis, unspecified acute appendicitis type  Goal:	post operative pain control, tolerate diet, local surgical incision wound care  Assessment and plan of treatment:	Dr. Reynoso, Acute Care Surgery in 7-10 days.  Call [phone] for appointment. Notify your surgeon and return to ER for temperatures greater than 101, chills sweats, pain not controlled with pain medications, persistent nausea and vomiting, or acutely concerning matters to you, that may require urgent medical attention.  Please keep incision sites clean and dry, shower only.  Do not bathe or immerse incision sites in water for a prolonged amount of time.  Steri-strips may fall of on their own in the shower.  Secondary Diagnosis:	GERD (gastroesophageal reflux disease)  Goal:	follow up  Assessment and plan of treatment:	Please follow up with your Primary Care Physician regarding your hospitalization, and schedule an appointment within two weeks after your discharge to review your hospital course.  Please  review all your current medications, and dose adjust as prescribed by your Primary Care Physician.  Call their office for appointment.

## 2018-06-03 VITALS
RESPIRATION RATE: 18 BRPM | DIASTOLIC BLOOD PRESSURE: 84 MMHG | TEMPERATURE: 98 F | SYSTOLIC BLOOD PRESSURE: 142 MMHG | OXYGEN SATURATION: 96 % | HEART RATE: 74 BPM

## 2018-06-03 PROCEDURE — 99024 POSTOP FOLLOW-UP VISIT: CPT

## 2018-06-03 RX ORDER — OXYCODONE AND ACETAMINOPHEN 5; 325 MG/1; MG/1
1 TABLET ORAL
Qty: 24 | Refills: 0
Start: 2018-06-03 | End: 2018-06-06

## 2018-06-03 RX ADMIN — Medication 100 MILLIGRAM(S): at 05:38

## 2018-06-03 RX ADMIN — OXYCODONE AND ACETAMINOPHEN 2 TABLET(S): 5; 325 TABLET ORAL at 13:39

## 2018-06-03 RX ADMIN — OXYCODONE AND ACETAMINOPHEN 2 TABLET(S): 5; 325 TABLET ORAL at 13:15

## 2018-06-03 RX ADMIN — OXYCODONE AND ACETAMINOPHEN 2 TABLET(S): 5; 325 TABLET ORAL at 06:01

## 2018-06-03 RX ADMIN — OXYCODONE AND ACETAMINOPHEN 2 TABLET(S): 5; 325 TABLET ORAL at 05:45

## 2018-06-03 RX ADMIN — VALSARTAN 80 MILLIGRAM(S): 80 TABLET ORAL at 05:38

## 2018-06-03 RX ADMIN — PANTOPRAZOLE SODIUM 40 MILLIGRAM(S): 20 TABLET, DELAYED RELEASE ORAL at 05:38

## 2018-06-03 RX ADMIN — ESCITALOPRAM OXALATE 20 MILLIGRAM(S): 10 TABLET, FILM COATED ORAL at 13:39

## 2018-06-03 RX ADMIN — ONDANSETRON 4 MILLIGRAM(S): 8 TABLET, FILM COATED ORAL at 07:56

## 2018-06-03 NOTE — PROGRESS NOTE ADULT - SUBJECTIVE AND OBJECTIVE BOX
ACS Progress NOTE    S: AVSS. Tolerating regular diet, pain well controlled. Stayed overnight for pain control       O:     PHYSICAL EXAM:    General: NAD    Respiratory: Nonlabored breathing, b/l CTA    Gastrointestinal: Soft, NTTP, ND, no guarding, incision C/D/I    Extremities: WWP    Neurological: alert, non-focal deficits, NATHANIEL basutro       06-01 @ 07:01  -  06-02 @ 07:00  --------------------------------------------------------  IN: 1700 mL / OUT: 850 mL / NET: 850 mL      Vital Signs Last 24 Hrs  T(C): 36.8 (03 Jun 2018 05:26), Max: 37.1 (02 Jun 2018 14:52)  T(F): 98.2 (03 Jun 2018 05:26), Max: 98.8 (02 Jun 2018 14:52)  HR: 76 (03 Jun 2018 05:26) (76 - 84)  BP: 130/84 (03 Jun 2018 05:26) (111/70 - 133/71)  BP(mean): --  RR: 18 (03 Jun 2018 05:26) (18 - 18)  SpO2: 96% (03 Jun 2018 05:26) (96% - 97%)          Assessment and Plan:     48y/o female with acute uncomplicated appendicitis s/p lap appendectomy, POD#2    - regular diet  - Pain control  - discharge today     [id number].

## 2018-06-03 NOTE — PROGRESS NOTE ADULT - ATTENDING COMMENTS
not yet tolerating diet  abd soft / mild tenderness / mild distended    POD#1 s/p laparoscopic appendectomy for acute appendicitis  -D/C home when tolerating diet
tolerating regular diet  abd soft / NT / ND    POD#2 s/p laparoscopic appendectomy for acute appendicitis  -D/C home

## 2018-06-06 LAB
CULTURE RESULTS: SIGNIFICANT CHANGE UP
SPECIMEN SOURCE: SIGNIFICANT CHANGE UP
SURGICAL PATHOLOGY STUDY: SIGNIFICANT CHANGE UP

## 2018-06-20 PROBLEM — Z00.00 ENCOUNTER FOR PREVENTIVE HEALTH EXAMINATION: Status: ACTIVE | Noted: 2018-06-20

## 2018-06-28 ENCOUNTER — APPOINTMENT (OUTPATIENT)
Dept: TRAUMA SURGERY | Facility: CLINIC | Age: 48
End: 2018-06-28
Payer: MEDICAID

## 2018-06-28 VITALS
TEMPERATURE: 98.6 F | HEIGHT: 62.5 IN | HEART RATE: 90 BPM | BODY MASS INDEX: 26.91 KG/M2 | DIASTOLIC BLOOD PRESSURE: 84 MMHG | SYSTOLIC BLOOD PRESSURE: 127 MMHG | WEIGHT: 150 LBS

## 2018-06-28 PROCEDURE — 99024 POSTOP FOLLOW-UP VISIT: CPT

## 2018-07-20 NOTE — ED ADULT NURSE NOTE - NS ED NURSE RECORD ANOTHER HT AND WT
Subjective:      Patient ID: Alida Chacon is a 63 y.o. female.    Chief Complaint: Pain of the Left Hip    Referring Provider: Lavell Moreno Md  [address] [place] [address]     HPI:  Ms. Chacon this is a 64-year-old female who was referred by Dr. Moreno for consultation left hip pain of a 2 year duration of insidious onset increasing in intensity over the last 6 months.  The patient denied trauma.  She stated, I was going to the gym but had to stop approximately 6 months ago because of the pain.  It is hard to go up stairs or even to step up on a curb.  She is currently on chronic steroids for kidney transplant.  She cannot take NSAIDs due to a kidney transplantation.  She did physical therapy in the past which did help.  She has had injections in the past but now reacts to preservatives in the injectables so cannot have any more injections.  When she did have injections they help.  Walking increases her pain. Motion improves it. She also states that she has numbness and tingling across the back the runs down her leg into her calf.  Currently she can walk approximately 1 block and cannot climb stairs.  She has to use a cane to ambulate.    Past Medical History:   Diagnosis Date    Arthritis     djd, problem lying flat    Cataract     OS    Chronic pancreatitis     Encounter for blood transfusion     Hypertension     Lupus     Medication intolerance     taking prilosec for the prevention after transplant    Seasonal allergies     Sleep apnea     Stroke 2005    Thyroid disease     parathyroid disease  GERD  IBS  Diverticulitis  Nephrolithiasis  Headaches  Hiatal hernia  Ulcers  History of chronic renal failure  Lupus clotting factor  MRSA carrier  Chronic pain management.               Past Surgical History:   Procedure Laterality Date    BREAST SURGERY      biopsy x 3    CATARACT EXTRACTION Right 01/18/2017    [id number] 19.0D.//    CHOLECYSTECTOMY      dialysis graft       all nonfuctional    HERNIA REPAIR      hiatal hernia repair    KIDNEY TRANSPLANT Right     STOMACH SURGERY      ulcer repair  Tubal ligation  Cardiac catheterization  EGD  Colonoscopy  Plantar fasciectomies bilaterally  Left tympanostomy tubes  Parathyroidectomy  Neck tumor excision  Lymph node dissection right neck  Laparotomy with endometriosis ablation.     Review of patient's allergies indicates:   Allergen Reactions    Versed [midazolam] Other (See Comments)     agitation    Compazine [prochlorperazine edisylate]     Cortisone      Angioedema with an injectible    Cytoxan [cyclophosphamide]     Lasix [furosemide]     Morphine     Penicillins     Preservatives [preservative]      Angioedema    Sulfa (sulfonamide antibiotics) Hives    Tetracyclines     Vicodin [hydrocodone-acetaminophen]      Social History     Occupational History    Disabled nurse.     Social History Main Topics    Smoking status: Never Smoker    Smokeless tobacco: Not on file    Alcohol use No    Drug use: No    Sexual activity: Not on file      Family History   Problem Relation Age of Onset    Cataracts Mother     Hypertension Mother     Diabetes Father     Hypertension Father     Thyroid disease Father     Strabismus Brother     Macular degeneration Maternal Uncle     Amblyopia Neg Hx     Blindness Neg Hx     Cancer Neg Hx     Glaucoma Neg Hx     Retinal detachment Neg Hx     Stroke Neg Hx      Previous Hospitalizations:  Kidney transplant, stroke.    Review of Systems   Constitution: Negative for chills and fever.   HENT: Negative for hoarse voice.    Eyes: Negative for double vision.   Cardiovascular: Negative for chest pain.   Respiratory: Negative for cough.    Endocrine: Negative for polydipsia.   Hematologic/Lymphatic: Bruises/bleeds easily.   Skin: Positive for poor wound healing.   Musculoskeletal: Positive for back pain, joint pain and muscle weakness.   Gastrointestinal: Negative for constipation  and diarrhea.   Genitourinary: Negative for flank pain.   Neurological: Positive for headaches and numbness.   Psychiatric/Behavioral: Negative for depression and substance abuse.          Objective:      Physical Exam:  General: AAOx3.  No acute distress  HEENT: Normocephalic, PEARLA EOMI, Fair Dentition  Neck: Supple, No JVD  Chest: Symetric, equal excursion on inspiration  Abdomen: Soft NTND, obese  Vascular:  Pulses intact and equal bilaterally.  Capillary refill at 3 seconds and equal bilaterally  Neurologic:  Pinprick and soft mildly blunted left lower extremity.  Mildly positive straight leg raising left lower extremity.  Integment:  No ecchymosis, no errythema  Extremity:  Hip:  Flexion/extension equal bilaterally greater than 95/15°.  Internal/external rotation slightly decreased on the left side compared to the right.  Tender with motion left hip. Danny/fabere/logroll/push-pull mildly positive left hip. Tender with palpation greater trochanter left hip. Jerry's positive left hip. Mild tenderness with palpation left groin.                      Lumbar spine:  Tender with palpation lumbosacral spine. Moderate increased paraspinal tone bilaterally. Forward flexion/backward flexion 50/10 degrees, increased symptoms with backward flexion. Right/left rotation 40/40 degrees mild increased symptoms with left rotation. Left/right side bending 20/25° mild increased symptoms with left side bending.  Decreased ability to heel and toe walk. Positive straight leg raising left lower extremity.  Radiography:  Personally reviewed x-rays of the left hip complete on 07/20/2018 which included an AP of the pelvis which showed multiple surgical clips within the pelvis and early AVN with left joint space narrowing cystic changes in the inferior femoral head osteophytes that the inferior femoral head and early head deformity.  Arthritic changes of the lumbosacral spine are also present.      Assessment:       Impression:     1.  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 Greater trochanteric bursitis of left hip    3. Radicular pain of left lower extremity          Plan:       1.  Discussed physical examination and radiographic findings with the patient. Alida understands that she has a combination of issues affecting her left hip 1 of them is she has early AVN and radicular symptoms from her lumbar spine. She has completed most conservative management that really the best treatment for hip is a total hip arthroplasty, but she has multiple comorbidities and she should probably consider treatment through [facility] setting where multiple subspecialties are available to address her comorbidities.  She stated she would like to be evaluated for her spine and her hip and would like to consider surgical intervention.    2.  Referred to [facility] orthopedic adult reconstruction to evaluate and possibly perform a left total hip arthroplasty.  3.  Referred to Spine surgery to evaluate and treat the patient.  4.  Since the patient has completed multiple physical therapy referrals will hold off until after she is evaluated for hip arthroplasty in case it is needed postop.  5.  Would not recommend NSAIDs as she is a renal transplant patient  6.  The patient states she cannot take steroid injections due to reaction to the preservative, if she could would recommend an injection to the greater trochanter bursa.  7.  Home exercises were discussed.  8.  Follow up p.r.n..           Yes

## 2018-09-26 ENCOUNTER — INPATIENT (INPATIENT)
Facility: HOSPITAL | Age: 48
LOS: 2 days | Discharge: HOME | DRG: 445 | End: 2018-09-29
Attending: SURGERY | Admitting: SURGERY
Payer: MEDICAID

## 2018-09-26 VITALS
WEIGHT: 160.06 LBS | HEIGHT: 62 IN | TEMPERATURE: 98 F | DIASTOLIC BLOOD PRESSURE: 81 MMHG | SYSTOLIC BLOOD PRESSURE: 111 MMHG | OXYGEN SATURATION: 96 % | HEART RATE: 106 BPM | RESPIRATION RATE: 20 BRPM

## 2018-09-26 DIAGNOSIS — K81.9 CHOLECYSTITIS, UNSPECIFIED: ICD-10-CM

## 2018-09-26 DIAGNOSIS — Z90.13 ACQUIRED ABSENCE OF BILATERAL BREASTS AND NIPPLES: Chronic | ICD-10-CM

## 2018-09-26 DIAGNOSIS — Z85.830 PERSONAL HISTORY OF MALIGNANT NEOPLASM OF BONE: Chronic | ICD-10-CM

## 2018-09-26 LAB
ALBUMIN SERPL ELPH-MCNC: 3.9 G/DL — SIGNIFICANT CHANGE UP (ref 3.3–5)
ALP SERPL-CCNC: 89 U/L — SIGNIFICANT CHANGE UP (ref 40–120)
ALT FLD-CCNC: 13 U/L — SIGNIFICANT CHANGE UP (ref 10–45)
ANION GAP SERPL CALC-SCNC: 8 MMOL/L — SIGNIFICANT CHANGE UP (ref 5–17)
APPEARANCE UR: CLEAR — SIGNIFICANT CHANGE UP
APTT BLD: 29 SEC — SIGNIFICANT CHANGE UP (ref 27.5–37.4)
AST SERPL-CCNC: 39 U/L — SIGNIFICANT CHANGE UP (ref 10–40)
BACTERIA # UR AUTO: NEGATIVE — SIGNIFICANT CHANGE UP
BASOPHILS # BLD AUTO: 0 K/UL — SIGNIFICANT CHANGE UP (ref 0–0.2)
BASOPHILS NFR BLD AUTO: 0.4 % — SIGNIFICANT CHANGE UP (ref 0–2)
BILIRUB SERPL-MCNC: 0.2 MG/DL — SIGNIFICANT CHANGE UP (ref 0.2–1.2)
BILIRUB UR-MCNC: NEGATIVE — SIGNIFICANT CHANGE UP
BLD GP AB SCN SERPL QL: NEGATIVE — SIGNIFICANT CHANGE UP
BUN SERPL-MCNC: 16 MG/DL — SIGNIFICANT CHANGE UP (ref 7–23)
CALCIUM SERPL-MCNC: 9.1 MG/DL — SIGNIFICANT CHANGE UP (ref 8.4–10.5)
CHLORIDE SERPL-SCNC: 107 MMOL/L — SIGNIFICANT CHANGE UP (ref 96–108)
CO2 SERPL-SCNC: 20 MMOL/L — LOW (ref 22–31)
COLOR SPEC: SIGNIFICANT CHANGE UP
CREAT SERPL-MCNC: 0.86 MG/DL — SIGNIFICANT CHANGE UP (ref 0.5–1.3)
DIFF PNL FLD: NEGATIVE — SIGNIFICANT CHANGE UP
EOSINOPHIL # BLD AUTO: 0.1 K/UL — SIGNIFICANT CHANGE UP (ref 0–0.5)
EOSINOPHIL NFR BLD AUTO: 1.4 % — SIGNIFICANT CHANGE UP (ref 0–6)
EPI CELLS # UR: 1 /HPF — SIGNIFICANT CHANGE UP
GLUCOSE SERPL-MCNC: 89 MG/DL — SIGNIFICANT CHANGE UP (ref 70–99)
GLUCOSE UR QL: NEGATIVE — SIGNIFICANT CHANGE UP
HCG UR QL: NEGATIVE — SIGNIFICANT CHANGE UP
HCT VFR BLD CALC: 35 % — SIGNIFICANT CHANGE UP (ref 34.5–45)
HCT VFR BLD CALC: 37.1 % — SIGNIFICANT CHANGE UP (ref 34.5–45)
HCT VFR BLD CALC: 43.3 % — SIGNIFICANT CHANGE UP (ref 34.5–45)
HGB BLD-MCNC: 11.8 G/DL — SIGNIFICANT CHANGE UP (ref 11.5–15.5)
HGB BLD-MCNC: 12.6 G/DL — SIGNIFICANT CHANGE UP (ref 11.5–15.5)
HGB BLD-MCNC: 14.7 G/DL — SIGNIFICANT CHANGE UP (ref 11.5–15.5)
INR BLD: 1.05 RATIO — SIGNIFICANT CHANGE UP (ref 0.88–1.16)
KETONES UR-MCNC: SIGNIFICANT CHANGE UP
LEUKOCYTE ESTERASE UR-ACNC: NEGATIVE — SIGNIFICANT CHANGE UP
LIDOCAIN IGE QN: 25 U/L — SIGNIFICANT CHANGE UP (ref 7–60)
LYMPHOCYTES # BLD AUTO: 1.4 K/UL — SIGNIFICANT CHANGE UP (ref 1–3.3)
LYMPHOCYTES # BLD AUTO: 15.8 % — SIGNIFICANT CHANGE UP (ref 13–44)
MCHC RBC-ENTMCNC: 29 PG — SIGNIFICANT CHANGE UP (ref 27–34)
MCHC RBC-ENTMCNC: 29.1 PG — SIGNIFICANT CHANGE UP (ref 27–34)
MCHC RBC-ENTMCNC: 33.8 GM/DL — SIGNIFICANT CHANGE UP (ref 32–36)
MCHC RBC-ENTMCNC: 33.9 GM/DL — SIGNIFICANT CHANGE UP (ref 32–36)
MCV RBC AUTO: 85.5 FL — SIGNIFICANT CHANGE UP (ref 80–100)
MCV RBC AUTO: 85.7 FL — SIGNIFICANT CHANGE UP (ref 80–100)
MCV RBC AUTO: 85.8 FL — SIGNIFICANT CHANGE UP (ref 80–100)
MONOCYTES # BLD AUTO: 0.4 K/UL — SIGNIFICANT CHANGE UP (ref 0–0.9)
MONOCYTES NFR BLD AUTO: 4.8 % — SIGNIFICANT CHANGE UP (ref 2–14)
NEUTROPHILS # BLD AUTO: 7 K/UL — SIGNIFICANT CHANGE UP (ref 1.8–7.4)
NEUTROPHILS NFR BLD AUTO: 77.5 % — HIGH (ref 43–77)
NITRITE UR-MCNC: NEGATIVE — SIGNIFICANT CHANGE UP
PH UR: 6 — SIGNIFICANT CHANGE UP (ref 5–8)
PLATELET # BLD AUTO: 195 K/UL — SIGNIFICANT CHANGE UP (ref 150–400)
PLATELET # BLD AUTO: 203 K/UL — SIGNIFICANT CHANGE UP (ref 150–400)
PLATELET # BLD AUTO: 243 K/UL — SIGNIFICANT CHANGE UP (ref 150–400)
POTASSIUM SERPL-MCNC: 4.7 MMOL/L — SIGNIFICANT CHANGE UP (ref 3.5–5.3)
POTASSIUM SERPL-SCNC: 4.7 MMOL/L — SIGNIFICANT CHANGE UP (ref 3.5–5.3)
PROT SERPL-MCNC: 7.4 G/DL — SIGNIFICANT CHANGE UP (ref 6–8.3)
PROT UR-MCNC: SIGNIFICANT CHANGE UP
PROTHROM AB SERPL-ACNC: 11.4 SEC — SIGNIFICANT CHANGE UP (ref 9.8–12.7)
RBC # BLD: 4.09 M/UL — SIGNIFICANT CHANGE UP (ref 3.8–5.2)
RBC # BLD: 4.32 M/UL — SIGNIFICANT CHANGE UP (ref 3.8–5.2)
RBC # BLD: 5.06 M/UL — SIGNIFICANT CHANGE UP (ref 3.8–5.2)
RBC # FLD: 15.1 % — HIGH (ref 10.3–14.5)
RBC # FLD: 15.2 % — HIGH (ref 10.3–14.5)
RBC # FLD: 15.5 % — HIGH (ref 10.3–14.5)
RBC CASTS # UR COMP ASSIST: 1 /HPF — SIGNIFICANT CHANGE UP (ref 0–4)
RH IG SCN BLD-IMP: POSITIVE — SIGNIFICANT CHANGE UP
SODIUM SERPL-SCNC: 135 MMOL/L — SIGNIFICANT CHANGE UP (ref 135–145)
SP GR SPEC: 1.02 — SIGNIFICANT CHANGE UP (ref 1.01–1.02)
UROBILINOGEN FLD QL: NEGATIVE — SIGNIFICANT CHANGE UP
WBC # BLD: 6 K/UL — SIGNIFICANT CHANGE UP (ref 3.8–10.5)
WBC # BLD: 6.4 K/UL — SIGNIFICANT CHANGE UP (ref 3.8–10.5)
WBC # BLD: 9 K/UL — SIGNIFICANT CHANGE UP (ref 3.8–10.5)
WBC # FLD AUTO: 6 K/UL — SIGNIFICANT CHANGE UP (ref 3.8–10.5)
WBC # FLD AUTO: 6.4 K/UL — SIGNIFICANT CHANGE UP (ref 3.8–10.5)
WBC # FLD AUTO: 9 K/UL — SIGNIFICANT CHANGE UP (ref 3.8–10.5)
WBC UR QL: 1 /HPF — SIGNIFICANT CHANGE UP (ref 0–5)

## 2018-09-26 PROCEDURE — 99284 EMERGENCY DEPT VISIT MOD MDM: CPT

## 2018-09-26 PROCEDURE — 76705 ECHO EXAM OF ABDOMEN: CPT | Mod: 26

## 2018-09-26 PROCEDURE — 99285 EMERGENCY DEPT VISIT HI MDM: CPT | Mod: 25

## 2018-09-26 PROCEDURE — 78226 HEPATOBILIARY SYSTEM IMAGING: CPT | Mod: 26

## 2018-09-26 PROCEDURE — 74177 CT ABD & PELVIS W/CONTRAST: CPT | Mod: 26

## 2018-09-26 RX ORDER — LIDOCAINE 4 G/100G
15 CREAM TOPICAL ONCE
Refills: 0 | Status: COMPLETED | OUTPATIENT
Start: 2018-09-26 | End: 2018-09-26

## 2018-09-26 RX ORDER — CIPROFLOXACIN LACTATE 400MG/40ML
400 VIAL (ML) INTRAVENOUS ONCE
Refills: 0 | Status: COMPLETED | OUTPATIENT
Start: 2018-09-26 | End: 2018-09-26

## 2018-09-26 RX ORDER — MORPHINE SULFATE 50 MG/1
4 CAPSULE, EXTENDED RELEASE ORAL ONCE
Refills: 0 | Status: DISCONTINUED | OUTPATIENT
Start: 2018-09-26 | End: 2018-09-26

## 2018-09-26 RX ORDER — CEFOTETAN DISODIUM 1 G
VIAL (EA) INJECTION
Refills: 0 | Status: DISCONTINUED | OUTPATIENT
Start: 2018-09-26 | End: 2018-09-27

## 2018-09-26 RX ORDER — PANTOPRAZOLE SODIUM 20 MG/1
80 TABLET, DELAYED RELEASE ORAL ONCE
Refills: 0 | Status: COMPLETED | OUTPATIENT
Start: 2018-09-26 | End: 2018-09-26

## 2018-09-26 RX ORDER — HYDROMORPHONE HYDROCHLORIDE 2 MG/ML
1 INJECTION INTRAMUSCULAR; INTRAVENOUS; SUBCUTANEOUS ONCE
Refills: 0 | Status: DISCONTINUED | OUTPATIENT
Start: 2018-09-26 | End: 2018-09-26

## 2018-09-26 RX ORDER — SODIUM CHLORIDE 9 MG/ML
1000 INJECTION INTRAMUSCULAR; INTRAVENOUS; SUBCUTANEOUS ONCE
Refills: 0 | Status: COMPLETED | OUTPATIENT
Start: 2018-09-26 | End: 2018-09-26

## 2018-09-26 RX ORDER — METRONIDAZOLE 500 MG
500 TABLET ORAL ONCE
Refills: 0 | Status: COMPLETED | OUTPATIENT
Start: 2018-09-26 | End: 2018-09-26

## 2018-09-26 RX ORDER — CEFOTETAN DISODIUM 1 G
2 VIAL (EA) INJECTION ONCE
Refills: 0 | Status: COMPLETED | OUTPATIENT
Start: 2018-09-26 | End: 2018-09-26

## 2018-09-26 RX ORDER — CEFOTETAN DISODIUM 1 G
2 VIAL (EA) INJECTION EVERY 12 HOURS
Refills: 0 | Status: DISCONTINUED | OUTPATIENT
Start: 2018-09-27 | End: 2018-09-27

## 2018-09-26 RX ORDER — FAMOTIDINE 10 MG/ML
20 INJECTION INTRAVENOUS ONCE
Refills: 0 | Status: COMPLETED | OUTPATIENT
Start: 2018-09-26 | End: 2018-09-26

## 2018-09-26 RX ORDER — LOSARTAN POTASSIUM 100 MG/1
50 TABLET, FILM COATED ORAL DAILY
Refills: 0 | Status: DISCONTINUED | OUTPATIENT
Start: 2018-09-26 | End: 2018-09-29

## 2018-09-26 RX ORDER — SODIUM CHLORIDE 9 MG/ML
1000 INJECTION, SOLUTION INTRAVENOUS
Refills: 0 | Status: DISCONTINUED | OUTPATIENT
Start: 2018-09-26 | End: 2018-09-27

## 2018-09-26 RX ORDER — ACETAMINOPHEN 500 MG
1000 TABLET ORAL ONCE
Refills: 0 | Status: COMPLETED | OUTPATIENT
Start: 2018-09-26 | End: 2018-09-26

## 2018-09-26 RX ORDER — ENOXAPARIN SODIUM 100 MG/ML
40 INJECTION SUBCUTANEOUS DAILY
Refills: 0 | Status: DISCONTINUED | OUTPATIENT
Start: 2018-09-26 | End: 2018-09-29

## 2018-09-26 RX ORDER — ONDANSETRON 8 MG/1
4 TABLET, FILM COATED ORAL ONCE
Refills: 0 | Status: COMPLETED | OUTPATIENT
Start: 2018-09-26 | End: 2018-09-26

## 2018-09-26 RX ADMIN — SODIUM CHLORIDE 1000 MILLILITER(S): 9 INJECTION INTRAMUSCULAR; INTRAVENOUS; SUBCUTANEOUS at 01:21

## 2018-09-26 RX ADMIN — MORPHINE SULFATE 4 MILLIGRAM(S): 50 CAPSULE, EXTENDED RELEASE ORAL at 03:00

## 2018-09-26 RX ADMIN — PANTOPRAZOLE SODIUM 80 MILLIGRAM(S): 20 TABLET, DELAYED RELEASE ORAL at 02:59

## 2018-09-26 RX ADMIN — LIDOCAINE 15 MILLILITER(S): 4 CREAM TOPICAL at 01:21

## 2018-09-26 RX ADMIN — FAMOTIDINE 20 MILLIGRAM(S): 10 INJECTION INTRAVENOUS at 01:21

## 2018-09-26 RX ADMIN — HYDROMORPHONE HYDROCHLORIDE 1 MILLIGRAM(S): 2 INJECTION INTRAMUSCULAR; INTRAVENOUS; SUBCUTANEOUS at 05:00

## 2018-09-26 RX ADMIN — Medication 100 MILLIGRAM(S): at 07:02

## 2018-09-26 RX ADMIN — HYDROMORPHONE HYDROCHLORIDE 1 MILLIGRAM(S): 2 INJECTION INTRAMUSCULAR; INTRAVENOUS; SUBCUTANEOUS at 04:05

## 2018-09-26 RX ADMIN — SODIUM CHLORIDE 125 MILLILITER(S): 9 INJECTION, SOLUTION INTRAVENOUS at 15:36

## 2018-09-26 RX ADMIN — MORPHINE SULFATE 4 MILLIGRAM(S): 50 CAPSULE, EXTENDED RELEASE ORAL at 02:06

## 2018-09-26 RX ADMIN — MORPHINE SULFATE 4 MILLIGRAM(S): 50 CAPSULE, EXTENDED RELEASE ORAL at 15:36

## 2018-09-26 RX ADMIN — Medication 200 MILLIGRAM(S): at 08:57

## 2018-09-26 RX ADMIN — MORPHINE SULFATE 4 MILLIGRAM(S): 50 CAPSULE, EXTENDED RELEASE ORAL at 08:29

## 2018-09-26 RX ADMIN — HYDROMORPHONE HYDROCHLORIDE 1 MILLIGRAM(S): 2 INJECTION INTRAMUSCULAR; INTRAVENOUS; SUBCUTANEOUS at 17:40

## 2018-09-26 RX ADMIN — SODIUM CHLORIDE 1000 MILLILITER(S): 9 INJECTION INTRAMUSCULAR; INTRAVENOUS; SUBCUTANEOUS at 02:58

## 2018-09-26 RX ADMIN — Medication 100 GRAM(S): at 16:23

## 2018-09-26 RX ADMIN — Medication 400 MILLIGRAM(S): at 23:33

## 2018-09-26 RX ADMIN — Medication 30 MILLILITER(S): at 01:21

## 2018-09-26 RX ADMIN — ONDANSETRON 4 MILLIGRAM(S): 8 TABLET, FILM COATED ORAL at 17:40

## 2018-09-26 NOTE — H&P ADULT - NSHPLABSRESULTS_GEN_ALL_CORE
CBC (09-26 @ 08:58)                              12.6                           6.4     )----------------(  203        --    % Neutrophils, --    % Lymphocytes, ANC: --                                  37.1    CBC (09-26 @ 01:20)                              14.7                           9.0     )----------------(  243        77.5<H>% Neutrophils, 15.8  % Lymphocytes, ANC: 7.0                                 43.3      BMP (09-26 @ 01:20)             135     |  107     |  16    		Ca++ --      Ca 9.1                ---------------------------------( 89    		Mg --                 4.7     |  20<L>   |  0.86  			Ph --        LFTs (09-26 @ 01:20)      TPro 7.4 / Alb 3.9 / TBili 0.2 / DBili -- / AST 39 / ALT 13 / AlkPhos 89    Coags (09-26 @ 07:30)  aPTT 29.0 / INR 1.05 / PT 11.4    IMAGING  < from: CT Abdomen and Pelvis w/ Oral Cont and w/ IV Cont (09.26.18 @ 05:45) >  IMPRESSION:    Findings concerning for acute cholecystitis. Distended gallbladder with   cholelithiasis and pericholecystic edema. Correlate with LFTs.   Sonographic evaluation should be considered.    < end of copied text >

## 2018-09-26 NOTE — CONSULT NOTE ADULT - ASSESSMENT
ASSESSMENT: Patient is a 48y old f with  ab pain and CT concerning for possible acute cholecystitis     PLAN:    - obtain HIDA scan  - send Pre-op labs   - Pt seen discussed Attending, Dr. Jael Hannon PGY-2

## 2018-09-26 NOTE — H&P ADULT - ATTENDING COMMENTS
Pt seen and examined.  Chart reviewed.  Resident note confirmed.  Pt is a 48 year-old female with a medical history significant for lymphoma/bilateral breast cancer/LLE osteosarcoma/HTN/GERD who presents to [facility] with abdominal pain.  Pt reports the pain has been present for last 3-4 days.  It is made worse by eating and is relieved by nothing.  She tried pepto bismol with no relief.  Associated with this is hematochezia/diarrhea.  Pt reports similar episodes in the past, which resolved without treatment.    CT abd:		Suggests cholecystitis  RUQ US:	Diagnostic of acute cholecystitis.  No evidence of cholelithiasis  HiDA:		No evidence of acute cholecystitis.  Failure to visualize small intestine at four hours.    PMH/PSH/MEDS/ALL/SH/FH/ROS:  Unchanged from H&P above  Vitals/PE/Labs/Radiographic data:  Reviewed     A/P  Neuro:  abdominal pain  	Continue pain control	    CVS:	CAD/HTN  	Monitor vitals    Pulm:  	Atelectasis  	ISP    GI:	Pt does not have acute raymundo   	Hematochezia  	Abdominal pain  	NPO for now  	GI consult for EGD/EUS    :  	No active issues  	Monitor I’s and O’s    Heme:	GI bleeding  	Monitor H/h    ID: 	No evidence of acute raymundo  	R/o biliary disease                Cont IV abx     Endo:	Mild Hyperglycemia  	Continue to monitor    Proph:  Hold DVT proph for bleeding

## 2018-09-26 NOTE — ED ADULT NURSE NOTE - OBJECTIVE STATEMENT
49 yo F pmh of breast CA, right lumpectomy done, b/l mastectomy performed, lymphoma in remission, LLE osteosarcoma, HTN, and GERD came to ED c/o diffuse abdominal pain, with associated diarrhea.  Pt states she has had diarrhea x 4days, developed blood in stool starting with small clots and turning to bright red blood over the course of two days. Pt states that she had episode of emesis prior to arrival to ED nbnb.  Pt states that she is feeling lightheaded.  Denies chest pain, back pain, SOB, fevers/chills, changes in urinary habits.  A&Ox4, abdomen soft, nondistended, tender to palpation in all quadrants.  Bowel sounds present x 4 quadrants.  Skin w/d/i. VSS.  +peripheral pulses.  Safety and comfort maintained. Husbands present at bedside. Will continue to monitor.

## 2018-09-26 NOTE — ED ADULT NURSE NOTE - NSIMPLEMENTINTERV_GEN_ALL_ED
Implemented All Universal Safety Interventions:  [place] to call system. Call bell, personal items and telephone within reach. Instruct patient to call for assistance. Room bathroom lighting operational. Non-slip footwear when patient is off stretcher. Physically safe environment: no spills, clutter or unnecessary equipment. Stretcher in lowest position, wheels locked, appropriate side rails in place.

## 2018-09-26 NOTE — ED ADULT NURSE REASSESSMENT NOTE - NS ED NURSE REASSESS COMMENT FT1
Pt had BM of bright red blood.  MD aware.
Pt states pain is not any better.  MD aware
cipro infusing,  present, seen by surgery resident, +pt has been NPO, awaiting adm and tentative OR time
pt back to [facility] to complete test, +adm to surgery dr santos
resting in bed, vitals stable, medicated for pain,  present, flagyl infusing, +ambulating to/from bathroom, +voiding and with persistent bloody stool, surgery consult pending, adm pending
Pt states pain is better.

## 2018-09-26 NOTE — H&P ADULT - HISTORY OF PRESENT ILLNESS
48 year-old female with history of lymphoma (in remission 6-7 years), LLE osteosarcoma, & breast cancer s/p B/L mastectomy & tissue expander/implant, HTN, GERD presents to the Emergency Department for ab pain. Pain has been present for last 3-4 days, worsens with food, nothing really alleviates. Also having nb/ nb diarrhea. Has had similair pain over last few years most recently a week ago but self resolved. Otherwise denied fever, chills, CP or SOB.

## 2018-09-26 NOTE — CONSULT NOTE ADULT - SUBJECTIVE AND OBJECTIVE BOX
HPI:    48 year-old female with history of lymphoma (in remission 6-7 years), LLE osteosarcoma, & breast cancer s/p B/L mastectomy & tissue expander/implant, HTN, GERD presents to the Emergency Department for ab pain. Pain has been present for last 3-4 days, worsens with food, nothing really alleviates. Also having nb/ nb diarrhea. Has had similair pain over last few years most recently a week ago but self resolved. Otherwise denied fever, chills, CP or SOB.    ROS: 10-system review is otherwise negative except HPI above.      PAST MEDICAL & SURGICAL HISTORY:  Breast cancer: lymph nodes removal in left  Depression  Hiatal hernia  GERD (gastroesophageal reflux disease)  Carcinoma  Lymphoma: S/p chemotherapy  Sarcoma  H/O osteosarcoma: Of LLE s/p resection  H/O mastectomy, bilateral    FAMILY HISTORY:    SOCIAL HISTORY: active smoker     ALLERGIES: No Known Allergies      HOME MEDICATIONS:   Diovan 80 mg oral capsule: 1 cap(s) orally once a day (2018 11:08)  PriLOSEC 20 mg oral delayed release capsule: 1 cap(s) orally once a day (2018 11:08)  Tylenol 325 mg oral capsule: 2 tab(s) orally every 4 hours, As Needed - for mild pain (2018 07:36)  Zofran ODT 4 mg oral tablet, disintegratin tab(s) orally 3 times a day (2018 11:08)      CURRENT MEDICATIONS  MEDICATIONS (STANDING):   MEDICATIONS (PRN):  --------------------------------------------------------------------------------------------    Vitals:   T(C): 36.9 (18 @ 08:30), Max: 36.9 (18 @ 08:30)  HR: 78 (18 @ 08:30) (75 - 106)  BP: 148/89 (18 @ 08:30) (111/81 - 148/89)  RR: 22 (18 @ 08:30) (20 - 22)  SpO2: 98% (18 @ 08:30) (96% - 98%)  CAPILLARY BLOOD GLUCOSE        CAPILLARY BLOOD GLUCOSE           @ 07:01  -   @ 07:00  --------------------------------------------------------  IN:    Sodium Chloride 0.9% IV Bolus: 2000 mL  Total IN: 2000 mL    OUT:  Total OUT: 0 mL    Total NET: 2000 mL       @ 07:01  -   @ 09:22  --------------------------------------------------------  IN:    IV PiggyBack: 400 mL  Total IN: 400 mL    OUT:  Total OUT: 0 mL    Total NET: 400 mL        Height (cm): 157.48 ( @ 00:16)  Weight (kg): 72.6 ( 00:16)  BMI (kg/m2): 29.3 (:16)  BSA (m2): 1.74 (:16)    PHYSICAL EXAM:  General: NAD  Cardiac: S1, S2, RRR  Respiratory: Bilateral breath sounds, clear and equal bilaterally  Abdomen: Soft, distended, diffusely tender to palpation juan in RUQ. Positive murphys on exam.     --------------------------------------------------------------------------------------------    LABS  CBC ( @ 08:58)                              12.6                           6.4     )----------------(  203        --    % Neutrophils, --    % Lymphocytes, ANC: --                                  37.1    CBC (:20)                              14.7                           9.0     )----------------(  243        77.5<H>% Neutrophils, 15.8  % Lymphocytes, ANC: 7.0                                 43.3      BMP ( 01:20)             135     |  107     |  16    		Ca++ --      Ca 9.1                ---------------------------------( 89    		Mg --                 4.7     |  20<L>   |  0.86  			Ph --        LFTs ( 01:20)      TPro 7.4 / Alb 3.9 / TBili 0.2 / DBili -- / AST 39 / ALT 13 / AlkPhos 89    Coags ( @ 07:30)  aPTT 29.0 / INR 1.05 / PT 11.4          --------------------------------------------------------------------------------------------    MICROBIOLOGY  Urinalysis ( @ 05:29):     Color: Light Yellow / Appearance: Clear / S.018 / pH: 6.0 / Gluc: Negative / Ketones: Trace / Bili: Negative / Urobili: Negative / Protein :Trace / Nitrites: Negative / Leuk.Est: Negative / RBC: 1 / WBC: 1 / Sq Epi:  / Non Sq Epi: 1 / Bacteria Negative         --------------------------------------------------------------------------------------------    IMAGING  < from: CT Abdomen and Pelvis w/ Oral Cont and w/ IV Cont (18 @ 05:45) >  IMPRESSION:    Findings concerning for acute cholecystitis. Distended gallbladder with   cholelithiasis and pericholecystic edema. Correlate with LFTs.   Sonographic evaluation should be considered.    < end of copied text >

## 2018-09-26 NOTE — H&P ADULT - ASSESSMENT
ASSESSMENT: Patient is a 48y old f with  ab pain and CT concerning for possible acute cholecystitis     PLAN:    -Admit to Dr. Elder genral surgery  - NPO/ IVF  - c/w home antihypertensives  - Strict I's and O's  - c/w IV ABx  - f/u HIDA scan  - Lovenox for DVT ppx      S Parvez PGY-2

## 2018-09-26 NOTE — ED PROVIDER NOTE - OBJECTIVE STATEMENT
48 year-old female with history of lymphoma (in remission 6-7 years), LLE osteosarcoma, & breast cancer s/p B/L mastectomy & tissue expander/implant, HTN, GERD presents to the Emergency Department for diarrhea.  Diarrhea for the past 4 days; has been having BIS since yesterday.  Blood was in the bowl; described as clots and on the tissue paper as well.  No fevers, chills, chest pain, shortness of breath, dysuria/hematuria.  Patient mentions diffuse abdominal pain; worsened with PO intake and tried Pepto-Bismol w/o relief.  Has had episodes of BIS in the past; seen for it last Dec and had a colonoscopy and endoscopy which was negative (except for hemorrhoids).  No recent antibiotic use. 48 year-old female with history of lymphoma (in remission 6-7 years), LLE osteosarcoma, & breast cancer s/p B/L mastectomy & tissue expander/implant, HTN, GERD presents to the Emergency Department for diarrhea.  Diarrhea for the past 4 days; has been having BIS since yesterday.  Blood was in the bowl; described as clots and on the tissue paper as well.  No fevers, chills, chest pain, shortness of breath, dysuria/hematuria.  Patient mentions diffuse abdominal pain; worsened with PO intake and tried Pepto-Bismol w/o relief.  Has had episodes of BIS in the past; seen for it last Dec and had a colonoscopy and endoscopy which was negative (except for hemorrhoids).  No recent antibiotic use.  Patient had BIS noted when had BM in ER.

## 2018-09-26 NOTE — ED PROVIDER NOTE - ATTENDING CONTRIBUTION TO CARE
Patient with history of lymphoma/osteosarcoma and bca, with recent appendectomy in 6/2018. Patient reports secondary to diarrhea with blood. Mild but with clots. Diffuse abdominal pain and no relief with Pepto-Bismol. Patient has had bloody bowel movements in the past and had colonoscopy and endoscopy at that time which was negative.   mildly dry mm  soft, mild general tenderness to palpation but no focality  no edema, non-tachypneic, hr 100s

## 2018-09-26 NOTE — H&P ADULT - NSHPPHYSICALEXAM_GEN_ALL_CORE
PHYSICAL EXAM:  General: NAD  Cardiac: S1, S2, RRR  Respiratory: Bilateral breath sounds, clear and equal bilaterally  Abdomen: Soft, distended, diffusely tender to palpation juan in RUQ. Positive murphys on exam.

## 2018-09-26 NOTE — ED PROVIDER NOTE - SHIFT CHANGE DETAILS
I have received sign out on this patient, briefly: 47 y/o F presenting with diffuse abdominal pain, diarrhea and diffuse tenderness on exam; findings on CT and US suggestive of cholecystitis; surgical consult pending. Alona

## 2018-09-26 NOTE — ED PROVIDER NOTE - PHYSICAL EXAMINATION
*Gen: NAD, AAO*3  *HEENT: NC/AT, dry mucous membranes, airway patent, trachea midline  *CV: RRR, S1/S2 present, no murmurs/rubs/gallops  *Resp: no respiratory distress, LCTAB, no wheezing/rales/rhonchi  *Abd: non-distended, mild abdominal discomfort throughout  *Neuro: no focal neuro deficits, moving all limbs appropriately  *Extremities: no gross deformity  *Skin: no rashes, no wounds   ~ Farrukh Mata M.D.

## 2018-09-27 DIAGNOSIS — R23.2 FLUSHING: ICD-10-CM

## 2018-09-27 DIAGNOSIS — K21.9 GASTRO-ESOPHAGEAL REFLUX DISEASE WITHOUT ESOPHAGITIS: ICD-10-CM

## 2018-09-27 DIAGNOSIS — I10 ESSENTIAL (PRIMARY) HYPERTENSION: ICD-10-CM

## 2018-09-27 DIAGNOSIS — Z79.899 OTHER LONG TERM (CURRENT) DRUG THERAPY: ICD-10-CM

## 2018-09-27 DIAGNOSIS — F17.200 NICOTINE DEPENDENCE, UNSPECIFIED, UNCOMPLICATED: ICD-10-CM

## 2018-09-27 DIAGNOSIS — K92.1 MELENA: ICD-10-CM

## 2018-09-27 DIAGNOSIS — K81.9 CHOLECYSTITIS, UNSPECIFIED: ICD-10-CM

## 2018-09-27 DIAGNOSIS — C80.1 MALIGNANT (PRIMARY) NEOPLASM, UNSPECIFIED: ICD-10-CM

## 2018-09-27 DIAGNOSIS — F32.9 MAJOR DEPRESSIVE DISORDER, SINGLE EPISODE, UNSPECIFIED: ICD-10-CM

## 2018-09-27 DIAGNOSIS — K80.50 CALCULUS OF BILE DUCT WITHOUT CHOLANGITIS OR CHOLECYSTITIS WITHOUT OBSTRUCTION: ICD-10-CM

## 2018-09-27 LAB
ALBUMIN SERPL ELPH-MCNC: 3.5 G/DL — SIGNIFICANT CHANGE UP (ref 3.3–5)
ALP SERPL-CCNC: 80 U/L — SIGNIFICANT CHANGE UP (ref 40–120)
ALT FLD-CCNC: 11 U/L — SIGNIFICANT CHANGE UP (ref 10–45)
ANION GAP SERPL CALC-SCNC: 16 MMOL/L — SIGNIFICANT CHANGE UP (ref 5–17)
AST SERPL-CCNC: 26 U/L — SIGNIFICANT CHANGE UP (ref 10–40)
BILIRUB DIRECT SERPL-MCNC: <0.1 MG/DL — SIGNIFICANT CHANGE UP (ref 0–0.2)
BILIRUB INDIRECT FLD-MCNC: >0 MG/DL — LOW (ref 0.2–1)
BILIRUB SERPL-MCNC: 0.1 MG/DL — LOW (ref 0.2–1.2)
BUN SERPL-MCNC: 10 MG/DL — SIGNIFICANT CHANGE UP (ref 7–23)
CALCIUM SERPL-MCNC: 8.5 MG/DL — SIGNIFICANT CHANGE UP (ref 8.4–10.5)
CHLORIDE SERPL-SCNC: 105 MMOL/L — SIGNIFICANT CHANGE UP (ref 96–108)
CO2 SERPL-SCNC: 16 MMOL/L — LOW (ref 22–31)
CREAT SERPL-MCNC: 0.71 MG/DL — SIGNIFICANT CHANGE UP (ref 0.5–1.3)
CULTURE RESULTS: SIGNIFICANT CHANGE UP
GLUCOSE SERPL-MCNC: 61 MG/DL — LOW (ref 70–99)
HCT VFR BLD CALC: 34.1 % — LOW (ref 34.5–45)
HCT VFR BLD CALC: 34.8 % — SIGNIFICANT CHANGE UP (ref 34.5–45)
HCT VFR BLD CALC: 36.4 % — SIGNIFICANT CHANGE UP (ref 34.5–45)
HGB BLD-MCNC: 11.6 G/DL — SIGNIFICANT CHANGE UP (ref 11.5–15.5)
HGB BLD-MCNC: 11.9 G/DL — SIGNIFICANT CHANGE UP (ref 11.5–15.5)
HGB BLD-MCNC: 12.3 G/DL — SIGNIFICANT CHANGE UP (ref 11.5–15.5)
MAGNESIUM SERPL-MCNC: 1.8 MG/DL — SIGNIFICANT CHANGE UP (ref 1.6–2.6)
MCHC RBC-ENTMCNC: 27.6 PG — SIGNIFICANT CHANGE UP (ref 27–34)
MCHC RBC-ENTMCNC: 28.9 PG — SIGNIFICANT CHANGE UP (ref 27–34)
MCHC RBC-ENTMCNC: 29.3 PG — SIGNIFICANT CHANGE UP (ref 27–34)
MCHC RBC-ENTMCNC: 33.3 GM/DL — SIGNIFICANT CHANGE UP (ref 32–36)
MCHC RBC-ENTMCNC: 33.8 GM/DL — SIGNIFICANT CHANGE UP (ref 32–36)
MCHC RBC-ENTMCNC: 34.9 GM/DL — SIGNIFICANT CHANGE UP (ref 32–36)
MCV RBC AUTO: 82.7 FL — SIGNIFICANT CHANGE UP (ref 80–100)
MCV RBC AUTO: 84 FL — SIGNIFICANT CHANGE UP (ref 80–100)
MCV RBC AUTO: 85.6 FL — SIGNIFICANT CHANGE UP (ref 80–100)
PHOSPHATE SERPL-MCNC: 2.6 MG/DL — SIGNIFICANT CHANGE UP (ref 2.5–4.5)
PLATELET # BLD AUTO: 198 K/UL — SIGNIFICANT CHANGE UP (ref 150–400)
PLATELET # BLD AUTO: 203 K/UL — SIGNIFICANT CHANGE UP (ref 150–400)
PLATELET # BLD AUTO: 215 K/UL — SIGNIFICANT CHANGE UP (ref 150–400)
POTASSIUM SERPL-MCNC: 4.3 MMOL/L — SIGNIFICANT CHANGE UP (ref 3.5–5.3)
POTASSIUM SERPL-SCNC: 4.3 MMOL/L — SIGNIFICANT CHANGE UP (ref 3.5–5.3)
PROT SERPL-MCNC: 6.4 G/DL — SIGNIFICANT CHANGE UP (ref 6–8.3)
RBC # BLD: 4.06 M/UL — SIGNIFICANT CHANGE UP (ref 3.8–5.2)
RBC # BLD: 4.21 M/UL — SIGNIFICANT CHANGE UP (ref 3.8–5.2)
RBC # BLD: 4.26 M/UL — SIGNIFICANT CHANGE UP (ref 3.8–5.2)
RBC # FLD: 15.1 % — HIGH (ref 10.3–14.5)
RBC # FLD: 16.3 % — HIGH (ref 10.3–14.5)
RBC # FLD: 16.4 % — HIGH (ref 10.3–14.5)
SODIUM SERPL-SCNC: 137 MMOL/L — SIGNIFICANT CHANGE UP (ref 135–145)
SPECIMEN SOURCE: SIGNIFICANT CHANGE UP
WBC # BLD: 6.5 K/UL — SIGNIFICANT CHANGE UP (ref 3.8–10.5)
WBC # BLD: 6.74 K/UL — SIGNIFICANT CHANGE UP (ref 3.8–10.5)
WBC # BLD: 6.84 K/UL — SIGNIFICANT CHANGE UP (ref 3.8–10.5)
WBC # FLD AUTO: 6.5 K/UL — SIGNIFICANT CHANGE UP (ref 3.8–10.5)
WBC # FLD AUTO: 6.74 K/UL — SIGNIFICANT CHANGE UP (ref 3.8–10.5)
WBC # FLD AUTO: 6.84 K/UL — SIGNIFICANT CHANGE UP (ref 3.8–10.5)

## 2018-09-27 PROCEDURE — 99223 1ST HOSP IP/OBS HIGH 75: CPT

## 2018-09-27 PROCEDURE — 99222 1ST HOSP IP/OBS MODERATE 55: CPT | Mod: GC

## 2018-09-27 PROCEDURE — 99233 SBSQ HOSP IP/OBS HIGH 50: CPT

## 2018-09-27 PROCEDURE — 74183 MRI ABD W/O CNTR FLWD CNTR: CPT | Mod: 26

## 2018-09-27 RX ORDER — ACETAMINOPHEN 500 MG
1000 TABLET ORAL ONCE
Refills: 0 | Status: COMPLETED | OUTPATIENT
Start: 2018-09-27 | End: 2018-09-27

## 2018-09-27 RX ORDER — INFLUENZA VIRUS VACCINE 15; 15; 15; 15 UG/.5ML; UG/.5ML; UG/.5ML; UG/.5ML
0.5 SUSPENSION INTRAMUSCULAR ONCE
Refills: 0 | Status: COMPLETED | OUTPATIENT
Start: 2018-09-27 | End: 2018-09-27

## 2018-09-27 RX ORDER — MAGNESIUM SULFATE 500 MG/ML
2 VIAL (ML) INJECTION ONCE
Refills: 0 | Status: COMPLETED | OUTPATIENT
Start: 2018-09-27 | End: 2018-09-27

## 2018-09-27 RX ORDER — ACETAMINOPHEN 500 MG
1000 TABLET ORAL ONCE
Refills: 0 | Status: DISCONTINUED | OUTPATIENT
Start: 2018-09-27 | End: 2018-09-27

## 2018-09-27 RX ORDER — MORPHINE SULFATE 50 MG/1
2 CAPSULE, EXTENDED RELEASE ORAL ONCE
Refills: 0 | Status: DISCONTINUED | OUTPATIENT
Start: 2018-09-27 | End: 2018-09-27

## 2018-09-27 RX ORDER — ACETAMINOPHEN 500 MG
1000 TABLET ORAL EVERY 6 HOURS
Refills: 0 | Status: COMPLETED | OUTPATIENT
Start: 2018-09-27 | End: 2018-09-28

## 2018-09-27 RX ORDER — ONDANSETRON 8 MG/1
4 TABLET, FILM COATED ORAL ONCE
Refills: 0 | Status: COMPLETED | OUTPATIENT
Start: 2018-09-27 | End: 2018-09-27

## 2018-09-27 RX ORDER — ALPRAZOLAM 0.25 MG
0.25 TABLET ORAL THREE TIMES A DAY
Refills: 0 | Status: DISCONTINUED | OUTPATIENT
Start: 2018-09-27 | End: 2018-09-27

## 2018-09-27 RX ORDER — LANOLIN ALCOHOL/MO/W.PET/CERES
3 CREAM (GRAM) TOPICAL AT BEDTIME
Refills: 0 | Status: DISCONTINUED | OUTPATIENT
Start: 2018-09-27 | End: 2018-09-29

## 2018-09-27 RX ORDER — DEXTROSE MONOHYDRATE, SODIUM CHLORIDE, AND POTASSIUM CHLORIDE 50; .745; 4.5 G/1000ML; G/1000ML; G/1000ML
1000 INJECTION, SOLUTION INTRAVENOUS
Refills: 0 | Status: DISCONTINUED | OUTPATIENT
Start: 2018-09-27 | End: 2018-09-28

## 2018-09-27 RX ORDER — ALPRAZOLAM 0.25 MG
0.25 TABLET ORAL THREE TIMES A DAY
Refills: 0 | Status: DISCONTINUED | OUTPATIENT
Start: 2018-09-27 | End: 2018-09-29

## 2018-09-27 RX ORDER — ACETAMINOPHEN 500 MG
1000 TABLET ORAL EVERY 6 HOURS
Refills: 0 | Status: COMPLETED | OUTPATIENT
Start: 2018-09-27 | End: 2018-09-27

## 2018-09-27 RX ORDER — NICOTINE POLACRILEX 2 MG
1 GUM BUCCAL DAILY
Refills: 0 | Status: DISCONTINUED | OUTPATIENT
Start: 2018-09-27 | End: 2018-09-29

## 2018-09-27 RX ORDER — PANTOPRAZOLE SODIUM 20 MG/1
40 TABLET, DELAYED RELEASE ORAL
Refills: 0 | Status: DISCONTINUED | OUTPATIENT
Start: 2018-09-27 | End: 2018-09-29

## 2018-09-27 RX ORDER — ESCITALOPRAM OXALATE 10 MG/1
20 TABLET, FILM COATED ORAL DAILY
Refills: 0 | Status: DISCONTINUED | OUTPATIENT
Start: 2018-09-27 | End: 2018-09-29

## 2018-09-27 RX ADMIN — Medication 400 MILLIGRAM(S): at 13:23

## 2018-09-27 RX ADMIN — Medication 3 MILLIGRAM(S): at 23:31

## 2018-09-27 RX ADMIN — Medication 0.25 MILLIGRAM(S): at 21:27

## 2018-09-27 RX ADMIN — Medication 1000 MILLIGRAM(S): at 08:25

## 2018-09-27 RX ADMIN — Medication 100 GRAM(S): at 05:18

## 2018-09-27 RX ADMIN — PANTOPRAZOLE SODIUM 40 MILLIGRAM(S): 20 TABLET, DELAYED RELEASE ORAL at 14:38

## 2018-09-27 RX ADMIN — ESCITALOPRAM OXALATE 20 MILLIGRAM(S): 10 TABLET, FILM COATED ORAL at 14:45

## 2018-09-27 RX ADMIN — MORPHINE SULFATE 2 MILLIGRAM(S): 50 CAPSULE, EXTENDED RELEASE ORAL at 21:09

## 2018-09-27 RX ADMIN — ONDANSETRON 4 MILLIGRAM(S): 8 TABLET, FILM COATED ORAL at 21:32

## 2018-09-27 RX ADMIN — ONDANSETRON 4 MILLIGRAM(S): 8 TABLET, FILM COATED ORAL at 07:30

## 2018-09-27 RX ADMIN — Medication 1000 MILLIGRAM(S): at 00:05

## 2018-09-27 RX ADMIN — Medication 400 MILLIGRAM(S): at 07:55

## 2018-09-27 RX ADMIN — Medication 1000 MILLIGRAM(S): at 14:10

## 2018-09-27 RX ADMIN — Medication 1 PATCH: at 13:23

## 2018-09-27 RX ADMIN — ONDANSETRON 4 MILLIGRAM(S): 8 TABLET, FILM COATED ORAL at 14:38

## 2018-09-27 RX ADMIN — LOSARTAN POTASSIUM 50 MILLIGRAM(S): 100 TABLET, FILM COATED ORAL at 05:18

## 2018-09-27 RX ADMIN — Medication 0.25 MILLIGRAM(S): at 14:38

## 2018-09-27 RX ADMIN — MORPHINE SULFATE 2 MILLIGRAM(S): 50 CAPSULE, EXTENDED RELEASE ORAL at 20:39

## 2018-09-27 RX ADMIN — Medication 50 GRAM(S): at 14:38

## 2018-09-27 NOTE — CONSULT NOTE ADULT - PROBLEM SELECTOR RECOMMENDATION 7
and concomitant anxiety  - resume home lexapro 20mg po daily  - resume home xanax 0.25mg po TID PRN- istop reference #[id number] Continue protonix 40mg IV daily for now- transition to PO when able

## 2018-09-27 NOTE — CONSULT NOTE ADULT - PROBLEM SELECTOR RECOMMENDATION 9
CT and US showing evidence of cholecystitis. No LFT abnormalities  - patient afebrile with some ongoing abdominal pain. Continue cefotetan as per surgery  - continue pain control and care as per surgery  - continue NPO pending plans for possible OR Home Medications: Verified with [facility] pharmacy  - xanax 0.25mg po TID  - lexapro 20mg po daily  - atarax 10mg po BID prn  - diovan 80mg po daily  - flonase  - prilosec 20mg po daily Active daily smoker  - cessation counseling provided, patient in precontemplative stage  - nicotine patch

## 2018-09-27 NOTE — CONSULT NOTE ADULT - SUBJECTIVE AND OBJECTIVE BOX
Chief Complaint:  Patient is a 48y old  Female who presents with a chief complaint of acute cholecystitis (27 Sep 2018 07:24)      HPI:  48 year-old female with history of lymphoma (in remission 6-7 years), LLE osteosarcoma, & breast cancer s/p B/L mastectomy & tissue expander/implant, HTN, GERD presents to the Emergency Department for ab pain. Pain has been present for last 3-4 days, worsens with food. She also has been recognizing bright red blood per rectum associated with diarrhea, when she wipes and also in the toilet. Last attempted BM was this morning, no stool, just drops of blood in the toilet. She has had similar episodes in the past and has been diagnosed with internal hemorrhoids by Dr. Mendelsohn @[facility] [place]. Otherwise denied fever, chills, CP or SOB, constipation, N/V.      Allergies:  No Known Allergies      Home Medications:    Hospital Medications:  acetaminophen  IVPB .. 1000 milliGRAM(s) IV Intermittent every 6 hours PRN  acetaminophen  IVPB .. 1000 milliGRAM(s) IV Intermittent every 6 hours PRN  cefoTEtan  IVPB      cefoTEtan  IVPB 2 Gram(s) IV Intermittent every 12 hours  enoxaparin Injectable 40 milliGRAM(s) SubCutaneous daily  influenza   Vaccine 0.5 milliLiter(s) IntraMuscular once  lactated ringers. 1000 milliLiter(s) IV Continuous <Continuous>  losartan 50 milliGRAM(s) Oral daily  magnesium sulfate  IVPB 2 Gram(s) IV Intermittent once  nicotine -   7 mG/24Hr(s) Patch 1 patch Transdermal daily      PMHX/PSHX:  Breast cancer  Depression  Hiatal hernia  GERD (gastroesophageal reflux disease)  Carcinoma  Lymphoma  Sarcoma  H/O osteosarcoma  H/O mastectomy, bilateral      Family history:  Denies    Social History: smoker, social alcohol, denies drugs    ROS:     General:  No wt loss, fevers, chills, night sweats, fatigue,   Eyes:  Good vision, no reported pain  ENT:  No sore throat, pain, runny nose, dysphagia  CV:  No pain, palpitations, hypo/hypertension  Resp:  No dyspnea, cough, tachypnea, wheezing  GI:  See HPI  :  No pain, bleeding, incontinence, nocturia  Muscle:  No pain, weakness  Neuro:  No weakness, tingling, memory problems  Psych:  No fatigue, insomnia, mood problems, depression  Endocrine:  No polyuria, polydipsia, cold/heat intolerance  Heme:  No petechiae, ecchymosis, easy bruisability  Skin:  No rash, edema      PHYSICAL EXAM:     GENERAL:  Appears stated age, well-groomed, well-nourished, no distress  HEENT:  NC/AT,  conjunctivae clear and pink,  no JVD  CHEST:  Full & symmetric excursion, no increased effort, breath sounds clear  HEART:  Regular rhythm, S1, S2, no murmur/rub/S3/S4, no abdominal bruit, no edema  ABDOMEN:  Soft, tender in RUQ, mildly-distended, hyperactive bowel sounds,  no masses. Rectal exam shows yellow stool with no blood, +hemorrhoids  EXTREMITIES:  no cyanosis,clubbing or edema  SKIN:  No rash/erythema/ecchymoses/petechiae/wounds/abscess/warm/dry  NEURO:  Alert, oriented    Vital Signs:  Vital Signs Last 24 Hrs  T(C): 36.7 (27 Sep 2018 04:44), Max: 37.1 (26 Sep 2018 19:29)  T(F): 98 (27 Sep 2018 04:44), Max: 98.7 (26 Sep 2018 19:29)  HR: 64 (27 Sep 2018 04:44) (63 - 87)  BP: 122/74 (27 Sep 2018 04:44) (117/72 - 176/116)  BP(mean): --  RR: 18 (27 Sep 2018 04:44) (18 - 18)  SpO2: 97% (27 Sep 2018 04:44) (97% - 99%)  Daily     Daily     LABS:                        11.9   6.84  )-----------( 203      ( 27 Sep 2018 09:48 )             34.1         137  |  105  |  10  ----------------------------<  61<L>  4.3   |  16<L>  |  0.71    Ca    8.5      27 Sep 2018 07:15  Phos  2.6       Mg     1.8         TPro  6.4  /  Alb  3.5  /  TBili  0.1<L>  /  DBili  <0.1  /  AST  26  /  ALT  11  /  AlkPhos  80      LIVER FUNCTIONS - ( 27 Sep 2018 07:15 )  Alb: 3.5 g/dL / Pro: 6.4 g/dL / ALK PHOS: 80 U/L / ALT: 11 U/L / AST: 26 U/L / GGT: x           PT/INR - ( 26 Sep 2018 07:30 )   PT: 11.4 sec;   INR: 1.05 ratio         PTT - ( 26 Sep 2018 07:30 )  PTT:29.0 sec  Urinalysis Basic - ( 26 Sep 2018 05:29 )    Color: Light Yellow / Appearance: Clear / S.018 / pH: x  Gluc: x / Ketone: Trace  / Bili: Negative / Urobili: Negative   Blood: x / Protein: Trace / Nitrite: Negative   Leuk Esterase: Negative / RBC: 1 /hpf / WBC 1 /hpf   Sq Epi: x / Non Sq Epi: 1 /hpf / Bacteria: Negative          Imaging:  < from: CT Abdomen and Pelvis w/ Oral Cont and w/ IV Cont (18 @ 05:45) >  Findings concerning for acute cholecystitis. Distended gallbladder with   cholelithiasis and pericholecystic edema. Correlate with LFTs.   Sonographic evaluation should be considered.    < end of copied text >      < from: US Abdomen Limited (ED) (18 @ 06:55) >  IMPRESSION:Acute Cholecystitis.    < from: NM Hepatobiliary Scan w/wo Gall Bladder (18 @ 11:45) >  IMPRESSION: Abnormal hepatobiliary scan. Nonvisualization of the bowel up   to 4 hours after radiopharmaceutical administration. The findings are   suggestive of a high-grade common bile duct obstruction, which may be   mechanical (stone) or functional (opioid pain medication).     No radionuclide evidence of acute cholecystitis.

## 2018-09-27 NOTE — CONSULT NOTE ADULT - PROBLEM SELECTOR RECOMMENDATION 3
Suspect hemorrhoidal, however given hiatal hernia and GERD history, would r/o UGIB  - f/u GI recs for possible endoscopy  - would start empiric PPI IV BID pending GI recs Suspect hemorrhoidal, however given hiatal hernia and GERD history, would r/o UGIB, although less likely  - f/u GI recs for possible endoscopy/colonoscopy  - monitor H/H, transfuse for Hb <7

## 2018-09-27 NOTE — CONSULT NOTE ADULT - ASSESSMENT
Patient is a 47 yo F with abd pain and HIDA concerning for possible choledocholithiasis, and bloody bowel movements    #Lower GI bleed 2/2 internal hemorrhoids vs diverticulosis vs malignancy  -H/H stable 12.3/36.4, continue to trend  -Diagnosed internal hemorrhoids as per patient  -Will obtain most recent colonoscopy from [facility] colonoscopy from 2013 showed hemorrhoids and diverticulosis  -Will consider colonoscopy once cleared from surgery for probable cholecystitis    #Acute cholecystitis/ choledocholithiasis  -Being managed by surgery Dr. Pool  -CT scan, US and HIDA suggestive of cholecystitis/ choledocholithiasis  -Awaiting MRCP today  -NPO/IVF  -Strict I/O  -C/w IV Abx Patient is a 49 yo F with abd pain and HIDA concerning for possible choledocholithiasis, and bloody bowel movements    #Lower GI bleed 2/2 internal hemorrhoids vs diverticulosis vs malignancy  -H/H stable 12.3/36.4, continue to trend  -Diagnosed internal hemorrhoids as per patient  -Will obtain most recent colonoscopy from [facility] [place] from a year ago  -[facility] colonoscopy from 2013 showed hemorrhoids and diverticulosis  -If with continued rectal bleed, Will consider colonoscopy once cleared from surgery for probable cholecystitis    #Acute cholecystitis/ choledocholithiasis  -Being managed by surgery Dr. Pool  -CT scan, US and HIDA suggestive of cholecystitis/ choledocholithiasis  -Awaiting MRCP today  -NPO/IVF  -Strict I/O  -C/w IV Abx

## 2018-09-27 NOTE — CONSULT NOTE ADULT - ASSESSMENT
48f hx Lymphoma s/p chemotherapy, in remission, L breast cancer s/p B/L mastectomy with reconstruction and tissue expanders, LLE osteosarcome s/p resection, HTN, depression/anxiety and known hemorrhoids presenting with abdominal pain and BRBPR concerning for cholecystitis and choledocolithiasis and GIB likely hemorrhoidal however must r/o UGIB

## 2018-09-27 NOTE — CONSULT NOTE ADULT - PROBLEM SELECTOR RECOMMENDATION 10
Home Medications: Verified with [facility] pharmacy  - xanax 0.25mg po TID  - lexapro 20mg po daily  - atarax 10mg po BID prn  - diovan 80mg po daily  - flonase  - prilosec 20mg po daily

## 2018-09-27 NOTE — CONSULT NOTE ADULT - PROBLEM SELECTOR RECOMMENDATION 5
Hx of lymphoma, breast cancer, and LLE osteosarcoma- all reportedly in remission  - continue OP follow up Pt on diovan 80mg po at home  - continue losartan 50mg po given drug shortage

## 2018-09-27 NOTE — CONSULT NOTE ADULT - SUBJECTIVE AND OBJECTIVE BOX
Patient is a 48y old  Female who presents with a chief complaint of acute cholecystitis (27 Sep 2018 07:24)  HPI: 48 year-old female with history of lymphoma (in remission 6-7 years), LLE osteosarcoma, & breast cancer s/p B/L mastectomy & tissue expander/implant, HTN, hiatal hernia with GERD, anxiety and  presents to the Emergency Department for ab pain. Pain has been present for last 3-4 days, worsens with food, nothing really alleviates. Initial CT suggestive of cholecystitis, corroborated by f/u US, HIDA also performed, which suggested CBD obstruction. At present, patient reports ongoing abdominal pain, somewhat improved and controlled with pain medication, no nausea/vomiting. Still has ongoing diarrhea, less watery and more solid but still admixed with bright red blood. +chest tightness with coughing, no holden pain, cough is nonproductive. Also reports she had L sided abdominal pain on presentation which has now improved.        Home Medications: Verified with [facility] pharmacy  - xanax 0.25mg po TID  - lexapro 20mg po daily  - atarax 10mg po BID prn  - diovan 80mg po daily  - flonase  - prilosec 20mg po daily    PAST MEDICAL & SURGICAL HISTORY:  Breast cancer: lymph nodes removal in left, s/p bilateral mastectomy with reconstruction and tissue expanders 2014  Depression/anxiety  Hiatal hernia  GERD (gastroesophageal reflux disease)  LLE osteosarcoma s/p resection  Lymphoma: S/p chemotherapy, in remission      Review of Systems:   CONSTITUTIONAL: No fever, weight loss, or fatigue  EYES: No eye pain, visual disturbances, or discharge  ENMT:  No difficulty hearing, tinnitus, vertigo; No sinus or throat pain  NECK: No pain or stiffness  BREASTS: No pain, masses, or nipple discharge  RESPIRATORY: No cough, wheezing, chills or hemoptysis; No shortness of breath  CARDIOVASCULAR: No chest pain, palpitations, dizziness, or leg swelling  GASTROINTESTINAL: No abdominal or epigastric pain. No nausea, vomiting, or hematemesis; No diarrhea or constipation. No melena or hematochezia.  GENITOURINARY: No dysuria, frequency, hematuria, or incontinence  NEUROLOGICAL: No headaches, memory loss, loss of strength, numbness, or tremors  SKIN: No itching, burning, rashes, or lesions   LYMPH NODES: No enlarged glands  ENDOCRINE: No heat or cold intolerance; No hair loss  MUSCULOSKELETAL: No joint pain or swelling; No muscle, back, or extremity pain  PSYCHIATRIC: No depression, anxiety, mood swings, or difficulty sleeping  HEME/LYMPH: No easy bruising, or bleeding gums  ALLERY AND IMMUNOLOGIC: No hives or eczema    Allergies    No Known Allergies    Intolerances    Social History: active daily smoker, 1ppd x 20 years, nondrinker, occasional marijuana smoker, lives at home with  and children. Functionally independent.    FAMILY HISTORY:  - no FH of cancers, HTN, DM or known heart disease    MEDICATIONS  (STANDING):  cefoTEtan  IVPB      cefoTEtan  IVPB 2 Gram(s) IV Intermittent every 12 hours  enoxaparin Injectable 40 milliGRAM(s) SubCutaneous daily  influenza   Vaccine 0.5 milliLiter(s) IntraMuscular once  lactated ringers. 1000 milliLiter(s) (125 mL/Hr) IV Continuous <Continuous>  losartan 50 milliGRAM(s) Oral daily  magnesium sulfate  IVPB 2 Gram(s) IV Intermittent once  nicotine -   7 mG/24Hr(s) Patch 1 patch Transdermal daily    MEDICATIONS  (PRN):  acetaminophen  IVPB .. 1000 milliGRAM(s) IV Intermittent every 6 hours PRN Mild Pain (1 - 3)  acetaminophen  IVPB .. 1000 milliGRAM(s) IV Intermittent every 6 hours PRN Mild Pain (1 - 3)    Vital Signs Last 24 Hrs  T(C): 36.7 (27 Sep 2018 04:44), Max: 37.1 (26 Sep 2018 19:29)  T(F): 98 (27 Sep 2018 04:44), Max: 98.7 (26 Sep 2018 19:29)  HR: 64 (27 Sep 2018 04:44) (63 - 87)  BP: 122/74 (27 Sep 2018 04:44) (117/72 - 176/116)  BP(mean): --  RR: 18 (27 Sep 2018 04:44) (18 - 18)  SpO2: 97% (27 Sep 2018 04:44) (97% - 99%)  CAPILLARY BLOOD GLUCOSE    PHYSICAL EXAM:  GENERAL: NAD, well-developed  HEAD:  Atraumatic, Normocephalic  EYES: EOMI, PERRLA, conjunctiva and sclera clear  NECK: Supple, No JVD  CHEST/LUNG: Clear to auscultation bilaterally; No wheeze  HEART: Regular rate and rhythm; No murmurs, rubs, or gallops  ABDOMEN: Soft, Nontender, Nondistended; Bowel sounds present  EXTREMITIES:  2+ Peripheral Pulses, No clubbing, cyanosis, or edema  PSYCH: AAOx3  NEUROLOGY: non-focal  SKIN: No rashes or lesions    LABS:                        11.9   6.84  )-----------( 203      ( 27 Sep 2018 09:48 )             34.1         137  |  105  |  10  ----------------------------<  61<L>  4.3   |  16<L>  |  0.71    Ca    8.5      27 Sep 2018 07:15  Phos  2.6       Mg     1.8         TPro  6.4  /  Alb  3.5  /  TBili  0.1<L>  /  DBili  <0.1  /  AST  26  /  ALT  11  /  AlkPhos  80      PT/INR - ( 26 Sep 2018 07:30 )   PT: 11.4 sec;   INR: 1.05 ratio         PTT - ( 26 Sep 2018 07:30 )  PTT:29.0 sec    Urinalysis Basic - ( 26 Sep 2018 05:29 )    Color: Light Yellow / Appearance: Clear / S.018 / pH: x  Gluc: x / Ketone: Trace  / Bili: Negative / Urobili: Negative   Blood: x / Protein: Trace / Nitrite: Negative   Leuk Esterase: Negative / RBC: 1 /hpf / WBC 1 /hpf   Sq Epi: x / Non Sq Epi: 1 /hpf / Bacteria: Negative    Culture - Urine (collected 26 Sep 2018 08:58)  Source: .Urine Clean Catch (Midstream)  Final Report (27 Sep 2018 10:59):    <10,000 CFU/ml Normal Urogenital donna present    RADIOLOGY & ADDITIONAL TESTS:    imaging:    < from: CT Abdomen and Pelvis w/ Oral Cont and w/ IV Cont (18 @ 05:45) >  IMPRESSION:    Findings concerning for acute cholecystitis. Distended gallbladder with   cholelithiasis and pericholecystic edema. Correlate with LFTs.   Sonographic evaluation should be considered.    < end of copied text >    < from: US Abdomen Limited (ED) (18 @ 06:55) >  IMPRESSION:Acute Cholecystitis.    < end of copied text >    < from: NM Hepatobiliary Scan w/wo Gall Bladder (18 @ 11:45) >  IMPRESSION: Abnormal hepatobiliary scan. Nonvisualization of the bowel up   to 4 hours after radiopharmaceutical administration. The findings are   suggestive of a high-grade common bile duct obstruction, which may be   mechanical (stone) or functional (opioid pain medication).     No radionuclide evidence of acute cholecystitis.    < end of copied text > Patient is a 48y old  Female who presents with a chief complaint of acute cholecystitis (27 Sep 2018 07:24)  HPI: 48 year-old female with history of lymphoma (in remission 6-7 years), LLE osteosarcoma, & breast cancer s/p B/L mastectomy & tissue expander/implant, HTN, hiatal hernia with GERD, anxiety and  presents to the Emergency Department for ab pain. Pain has been present for last 3-4 days, worsens with food, nothing really alleviates. Initial CT suggestive of cholecystitis, corroborated by f/u US, HIDA also performed, which suggested CBD obstruction. At present, patient reports ongoing abdominal pain, somewhat improved and controlled with pain medication, no nausea/vomiting. Still has ongoing diarrhea, less watery and more solid but still admixed with bright red blood. +chest tightness with coughing, no holden pain, cough is nonproductive, chronic. +hot flashes without documented fevers. Also reports she had L sided abdominal pain on presentation which has now improved.        Home Medications: Verified with [facility] pharmacy  - xanax 0.25mg po TID  - lexapro 20mg po daily  - atarax 10mg po BID prn  - diovan 80mg po daily  - flonase  - prilosec 20mg po daily    PAST MEDICAL & SURGICAL HISTORY:  Breast cancer: lymph nodes removal in left, s/p bilateral mastectomy with reconstruction and tissue expanders 2014  Depression/anxiety  Hiatal hernia  GERD (gastroesophageal reflux disease)  LLE osteosarcoma s/p resection  Lymphoma: S/p chemotherapy, in remission      Review of Systems:   CONSTITUTIONAL: No fever, weight loss, or fatigue  EYES: No eye pain, visual disturbances, or discharge  ENMT:  No difficulty hearing, tinnitus, vertigo; No sinus or throat pain  NECK: No pain or stiffness  BREASTS: No pain, masses, or nipple discharge  RESPIRATORY: No cough, wheezing, chills or hemoptysis; No shortness of breath  CARDIOVASCULAR: No chest pain, palpitations, dizziness, or leg swelling  GASTROINTESTINAL: No abdominal or epigastric pain. No nausea, vomiting, or hematemesis; No diarrhea or constipation. No melena or hematochezia.  GENITOURINARY: No dysuria, frequency, hematuria, or incontinence  NEUROLOGICAL: No headaches, memory loss, loss of strength, numbness, or tremors  SKIN: No itching, burning, rashes, or lesions   LYMPH NODES: No enlarged glands  ENDOCRINE: No heat or cold intolerance; No hair loss  MUSCULOSKELETAL: No joint pain or swelling; No muscle, back, or extremity pain  PSYCHIATRIC: No depression, anxiety, mood swings, or difficulty sleeping  HEME/LYMPH: No easy bruising, or bleeding gums  ALLERY AND IMMUNOLOGIC: No hives or eczema    Allergies    No Known Allergies    Intolerances    Social History: active daily smoker, 1ppd x 20 years, nondrinker, occasional marijuana smoker, lives at home with  and children. Functionally independent.    FAMILY HISTORY:  - no FH of cancers, HTN, DM or known heart disease    MEDICATIONS  (STANDING):  cefoTEtan  IVPB      cefoTEtan  IVPB 2 Gram(s) IV Intermittent every 12 hours  enoxaparin Injectable 40 milliGRAM(s) SubCutaneous daily  influenza   Vaccine 0.5 milliLiter(s) IntraMuscular once  lactated ringers. 1000 milliLiter(s) (125 mL/Hr) IV Continuous <Continuous>  losartan 50 milliGRAM(s) Oral daily  magnesium sulfate  IVPB 2 Gram(s) IV Intermittent once  nicotine -   7 mG/24Hr(s) Patch 1 patch Transdermal daily    MEDICATIONS  (PRN):  acetaminophen  IVPB .. 1000 milliGRAM(s) IV Intermittent every 6 hours PRN Mild Pain (1 - 3)  acetaminophen  IVPB .. 1000 milliGRAM(s) IV Intermittent every 6 hours PRN Mild Pain (1 - 3)    Vital Signs Last 24 Hrs  T(C): 36.7 (27 Sep 2018 04:44), Max: 37.1 (26 Sep 2018 19:29)  T(F): 98 (27 Sep 2018 04:44), Max: 98.7 (26 Sep 2018 19:29)  HR: 64 (27 Sep 2018 04:44) (63 - 87)  BP: 122/74 (27 Sep 2018 04:44) (117/72 - 176/116)  BP(mean): --  RR: 18 (27 Sep 2018 04:44) (18 - 18)  SpO2: 97% (27 Sep 2018 04:44) (97% - 99%)  CAPILLARY BLOOD GLUCOSE    PHYSICAL EXAM:  GENERAL: NAD, well-developed  HEAD:  Atraumatic, Normocephalic  EYES: EOMI, PERRLA, conjunctiva and sclera clear  NECK: Supple, No JVD  CHEST/LUNG: Clear to auscultation bilaterally; No wheeze  HEART: Regular rate and rhythm; No murmurs, rubs, or gallops  ABDOMEN: Soft, Nontender, Nondistended; Bowel sounds present  EXTREMITIES:  2+ Peripheral Pulses, No clubbing, cyanosis, or edema  PSYCH: AAOx3  NEUROLOGY: non-focal  SKIN: No rashes or lesions    LABS:                        11.9   6.84  )-----------( 203      ( 27 Sep 2018 09:48 )             34.1         137  |  105  |  10  ----------------------------<  61<L>  4.3   |  16<L>  |  0.71    Ca    8.5      27 Sep 2018 07:15  Phos  2.6       Mg     1.8         TPro  6.4  /  Alb  3.5  /  TBili  0.1<L>  /  DBili  <0.1  /  AST  26  /  ALT  11  /  AlkPhos  80      PT/INR - ( 26 Sep 2018 07:30 )   PT: 11.4 sec;   INR: 1.05 ratio         PTT - ( 26 Sep 2018 07:30 )  PTT:29.0 sec    Urinalysis Basic - ( 26 Sep 2018 05:29 )    Color: Light Yellow / Appearance: Clear / S.018 / pH: x  Gluc: x / Ketone: Trace  / Bili: Negative / Urobili: Negative   Blood: x / Protein: Trace / Nitrite: Negative   Leuk Esterase: Negative / RBC: 1 /hpf / WBC 1 /hpf   Sq Epi: x / Non Sq Epi: 1 /hpf / Bacteria: Negative    Culture - Urine (collected 26 Sep 2018 08:58)  Source: .Urine Clean Catch (Midstream)  Final Report (27 Sep 2018 10:59):    <10,000 CFU/ml Normal Urogenital donna present    RADIOLOGY & ADDITIONAL TESTS:    imaging:    < from: CT Abdomen and Pelvis w/ Oral Cont and w/ IV Cont (18 @ 05:45) >  IMPRESSION:    Findings concerning for acute cholecystitis. Distended gallbladder with   cholelithiasis and pericholecystic edema. Correlate with LFTs.   Sonographic evaluation should be considered.    < end of copied text >    < from: US Abdomen Limited (ED) (18 @ 06:55) >  IMPRESSION:Acute Cholecystitis.    < end of copied text >    < from: NM Hepatobiliary Scan w/wo Gall Bladder (18 @ 11:45) >  IMPRESSION: Abnormal hepatobiliary scan. Nonvisualization of the bowel up   to 4 hours after radiopharmaceutical administration. The findings are   suggestive of a high-grade common bile duct obstruction, which may be   mechanical (stone) or functional (opioid pain medication).     No radionuclide evidence of acute cholecystitis.    < end of copied text >

## 2018-09-27 NOTE — CONSULT NOTE ADULT - PROBLEM SELECTOR RECOMMENDATION 4
Pt on diovan 80mg po at home  - continue losartan 50mg po given drug shortage Possible perimenopausal vs. possible hyperthyroidism  - check TSH

## 2018-09-27 NOTE — CONSULT NOTE ADULT - PROBLEM SELECTOR RECOMMENDATION 6
Continue protonix 40mg IV daily for now- transition to PO when able Hx of lymphoma, breast cancer, and LLE osteosarcoma- all reportedly in remission  - continue OP follow up

## 2018-09-27 NOTE — CONSULT NOTE ADULT - PROBLEM SELECTOR RECOMMENDATION 2
Impression: Hypermetropia, bilateral: H52.03. OU. Plan: Discussed diagnosis in detail with patient. Discussed treatment options with patient. New glasses Rx was given today. Suggestion of CBD obstruction stone vs. functional obstruction on NM scan. No LFT abnormalities  - f/u GI consult re: possible EGD and EUS No

## 2018-09-27 NOTE — PROGRESS NOTE ADULT - ATTENDING COMMENTS
seen and examined 09-27-18 @ 1335    HIDA demonstrated prompt filling of gallbladder, but no filling of duodenum after for hours. So initially concerned about CBD obstruction, but LFTs wnl and MRCP demonstrated no CBD stones or dilatation. Only duodenal diverticulum without inflammation which was present on prior CT scans. There is gallbladder wall edema, but this is unlikely to represent acute cholecystitis because she has very minimal RUQ tenderness and HIDA ruled out cholecystitis. Also symptomatic cholelithiasis is unlikely based on further history obtained as follows.    She has had clammy skin associated with warm feeling, despite air conditioning for some time. She also has no menstruation since 2/2018.  On 6/1/2018 she underwent laparoscopic appendectomy and pathology confirmed acute cholecystitis.  She has had 30 lbs unintentional weight loss over the past 5 months, confirmed based on EMR.  Last Friday, she began to have diffuse abdominal pain.  On Saturday, she began to have diarrhea, initially green-brown and watery and then became yellow.  Yesterday, she developed nausea and vomiting which prompted ER visit and subsequent admission.  Some of her symptoms could be secondary to early menopause. 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 She believes that she was only screened for BRCA mutation at [facility] [place], but no other genetic predispositions. which are suspicious to me based on 3 seemingly unrelated malignancies in a 3 year period at a young age.    I have concern for hormonal etiology of early menopause, but unclear if this is causing her GI symptoms for the past week.    afeb  AVSS  appears comfortable in bed  abd soft / very mild RUQ tenderness / ND  no cervical of inguinal lymphadenopathy    At this time, diagnosis is unclear and there is no surgery indicated. Seen by medicine today, and her PMD Dr Armas plans to visit her tomorrow. I explained the above to the patient and reassured her that further diagnostic workup will be pursued. seen and examined 09-27-18 @ 1335    HIDA demonstrated prompt filling of gallbladder, but no filling of duodenum after for hours. So initially concerned about CBD obstruction, but LFTs wnl and MRCP demonstrated no CBD stones or dilatation. Only duodenal diverticulum without inflammation which was present on prior CT scans. There is gallbladder wall edema, but this is unlikely to represent acute cholecystitis because she has very minimal RUQ tenderness and HIDA ruled out cholecystitis. Also symptomatic cholelithiasis is unlikely based on further history obtained as follows.    She has had clammy skin associated with warm feeling, despite air conditioning for some time. She also has no menstruation since 2/2018.  On 6/1/2018 she underwent laparoscopic appendectomy and pathology confirmed acute appendicitis.  She has had 30 lbs unintentional weight loss over the past 5 months, confirmed based on EMR.  Last Friday, she began to have diffuse abdominal pain.  On Saturday, she began to have diarrhea, initially green-brown and watery and then became yellow.  Yesterday, she developed nausea and vomiting which prompted ER visit and subsequent admission.  Some of her symptoms could be secondary to early menopause. 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 She believes that she was only screened for BRCA mutation at [facility] [place], but no other genetic predispositions. which are suspicious to me based on 3 seemingly unrelated malignancies in a 3 year period at a young age.    I have concern for hormonal etiology of early menopause, but unclear if this is causing her GI symptoms for the past week.    afeb  AVSS  appears comfortable in bed  abd soft / very mild RUQ tenderness / ND  no cervical of inguinal lymphadenopathy    At this time, diagnosis is unclear and there is no surgery indicated. Seen by medicine today, and her PMD Dr Armas plans to visit her tomorrow. I explained the above to the patient and reassured her that further diagnostic workup will be pursued.

## 2018-09-27 NOTE — PROGRESS NOTE ADULT - SUBJECTIVE AND OBJECTIVE BOX
ACS DAILY PROGRESS NOTE:       SUBJECTIVE/ROS: Patient complaining of pain RUQ- just recently had a bloody BM- hematocrit stable overnight- now nauseous no vomiting  Denies chest pain, shortness of breath         MEDICATIONS  (STANDING):  acetaminophen  IVPB .. 1000 milliGRAM(s) IV Intermittent once  cefoTEtan  IVPB      cefoTEtan  IVPB 2 Gram(s) IV Intermittent every 12 hours  enoxaparin Injectable 40 milliGRAM(s) SubCutaneous daily  influenza   Vaccine 0.5 milliLiter(s) IntraMuscular once  lactated ringers. 1000 milliLiter(s) (125 mL/Hr) IV Continuous <Continuous>  losartan 50 milliGRAM(s) Oral daily  nicotine -   7 mG/24Hr(s) Patch 1 patch Transdermal daily  ondansetron Injectable 4 milliGRAM(s) IV Push once    MEDICATIONS  (PRN):      OBJECTIVE:    Vital Signs Last 24 Hrs  T(C): 36.7 (27 Sep 2018 04:44), Max: 37.1 (26 Sep 2018 12:38)  T(F): 98 (27 Sep 2018 04:44), Max: 98.7 (26 Sep 2018 12:38)  HR: 64 (27 Sep 2018 04:44) (63 - 87)  BP: 122/74 (27 Sep 2018 04:44) (117/72 - 176/116)  BP(mean): --  RR: 18 (27 Sep 2018 04:44) (18 - 22)  SpO2: 97% (27 Sep 2018 04:44) (97% - 99%)        I&O's Detail    26 Sep 2018 07:01  -  27 Sep 2018 07:00  --------------------------------------------------------  IN:    lactated ringers.: 1375 mL    Solution: 500 mL    Solution: 50 mL  Total IN: 1925 mL    OUT:  Total OUT: 0 mL    Total NET: 1925 mL          Daily     Daily     LABS:                        12.3   6.5   )-----------( 198      ( 27 Sep 2018 00:57 )             36.4     09-26    135  |  107  |  16  ----------------------------<  89  4.7   |  20<L>  |  0.86    Ca    9.1      26 Sep 2018 01:20    TPro  7.4  /  Alb  3.9  /  TBili  0.2  /  DBili  x   /  AST  39  /  ALT  13  /  AlkPhos  89  09-26    PT/INR - ( 26 Sep 2018 07:30 )   PT: 11.4 sec;   INR: 1.05 ratio         PTT - ( 26 Sep 2018 07:30 )  PTT:29.0 sec  Urinalysis Basic - ( 26 Sep 2018 05:29 )    Color: Light Yellow / Appearance: Clear / S.018 / pH: x  Gluc: x / Ketone: Trace  / Bili: Negative / Urobili: Negative   Blood: x / Protein: Trace / Nitrite: Negative   Leuk Esterase: Negative / RBC: 1 /hpf / WBC 1 /hpf   Sq Epi: x / Non Sq Epi: 1 /hpf / Bacteria: Negative              Physical Exam:   General: NAD  Cardiac: S1, S2, RRR  Respiratory: Bilateral breath sounds, clear and equal bilaterally Abdomen: Soft, distended, diffusely tender to palpation juan in RUQ.

## 2018-09-27 NOTE — CONSULT NOTE ADULT - PROBLEM SELECTOR RECOMMENDATION 8
Active daily smoker  - cessation counseling provided, patient in precontemplative stage  - nicotine patch and concomitant anxiety  - resume home lexapro 20mg po daily  - resume home xanax 0.25mg po TID PRN- istop reference #[id number]

## 2018-09-27 NOTE — PROGRESS NOTE ADULT - ASSESSMENT
ASSESSMENT: Patient is a 48y old f with  abd pain and HIDA concerning for possible choledocholithiasis, bloody BMs    PLAN:    - NPO/ IVF  - trend H & H  - c/w home antihypertensives  - Strict I's and O's  - c/w IV ABx  - GI consult  - MRCP    Gely Singh PA-C [phone]

## 2018-09-28 DIAGNOSIS — R10.9 UNSPECIFIED ABDOMINAL PAIN: ICD-10-CM

## 2018-09-28 LAB
ANION GAP SERPL CALC-SCNC: 13 MMOL/L — SIGNIFICANT CHANGE UP (ref 5–17)
BUN SERPL-MCNC: <4 MG/DL — LOW (ref 7–23)
CALCIUM SERPL-MCNC: 8.9 MG/DL — SIGNIFICANT CHANGE UP (ref 8.4–10.5)
CHLORIDE SERPL-SCNC: 103 MMOL/L — SIGNIFICANT CHANGE UP (ref 96–108)
CO2 SERPL-SCNC: 21 MMOL/L — LOW (ref 22–31)
CREAT SERPL-MCNC: 0.66 MG/DL — SIGNIFICANT CHANGE UP (ref 0.5–1.3)
GLUCOSE SERPL-MCNC: 105 MG/DL — HIGH (ref 70–99)
HCT VFR BLD CALC: 34.3 % — LOW (ref 34.5–45)
HCT VFR BLD CALC: 37.5 % — SIGNIFICANT CHANGE UP (ref 34.5–45)
HGB BLD-MCNC: 11.7 G/DL — SIGNIFICANT CHANGE UP (ref 11.5–15.5)
HGB BLD-MCNC: 12.7 G/DL — SIGNIFICANT CHANGE UP (ref 11.5–15.5)
MAGNESIUM SERPL-MCNC: 2.2 MG/DL — SIGNIFICANT CHANGE UP (ref 1.6–2.6)
MCHC RBC-ENTMCNC: 27.9 PG — SIGNIFICANT CHANGE UP (ref 27–34)
MCHC RBC-ENTMCNC: 28.6 PG — SIGNIFICANT CHANGE UP (ref 27–34)
MCHC RBC-ENTMCNC: 33.8 GM/DL — SIGNIFICANT CHANGE UP (ref 32–36)
MCHC RBC-ENTMCNC: 34.1 GM/DL — SIGNIFICANT CHANGE UP (ref 32–36)
MCV RBC AUTO: 81.7 FL — SIGNIFICANT CHANGE UP (ref 80–100)
MCV RBC AUTO: 84.6 FL — SIGNIFICANT CHANGE UP (ref 80–100)
PHOSPHATE SERPL-MCNC: 1.8 MG/DL — LOW (ref 2.5–4.5)
PLATELET # BLD AUTO: 217 K/UL — SIGNIFICANT CHANGE UP (ref 150–400)
PLATELET # BLD AUTO: 222 K/UL — SIGNIFICANT CHANGE UP (ref 150–400)
POTASSIUM SERPL-MCNC: 3.8 MMOL/L — SIGNIFICANT CHANGE UP (ref 3.5–5.3)
POTASSIUM SERPL-SCNC: 3.8 MMOL/L — SIGNIFICANT CHANGE UP (ref 3.5–5.3)
RBC # BLD: 4.2 M/UL — SIGNIFICANT CHANGE UP (ref 3.8–5.2)
RBC # BLD: 4.43 M/UL — SIGNIFICANT CHANGE UP (ref 3.8–5.2)
RBC # FLD: 15.1 % — HIGH (ref 10.3–14.5)
RBC # FLD: 16.3 % — HIGH (ref 10.3–14.5)
SODIUM SERPL-SCNC: 137 MMOL/L — SIGNIFICANT CHANGE UP (ref 135–145)
TROPONIN T, HIGH SENSITIVITY RESULT: 8 NG/L — SIGNIFICANT CHANGE UP (ref 0–51)
WBC # BLD: 6.4 K/UL — SIGNIFICANT CHANGE UP (ref 3.8–10.5)
WBC # BLD: 7.3 K/UL — SIGNIFICANT CHANGE UP (ref 3.8–10.5)
WBC # FLD AUTO: 6.4 K/UL — SIGNIFICANT CHANGE UP (ref 3.8–10.5)
WBC # FLD AUTO: 7.3 K/UL — SIGNIFICANT CHANGE UP (ref 3.8–10.5)

## 2018-09-28 PROCEDURE — 93010 ELECTROCARDIOGRAM REPORT: CPT

## 2018-09-28 PROCEDURE — 99232 SBSQ HOSP IP/OBS MODERATE 35: CPT | Mod: GC

## 2018-09-28 PROCEDURE — 99233 SBSQ HOSP IP/OBS HIGH 50: CPT

## 2018-09-28 RX ORDER — ONDANSETRON 8 MG/1
4 TABLET, FILM COATED ORAL EVERY 6 HOURS
Refills: 0 | Status: DISCONTINUED | OUTPATIENT
Start: 2018-09-28 | End: 2018-09-29

## 2018-09-28 RX ORDER — ONDANSETRON 8 MG/1
4 TABLET, FILM COATED ORAL ONCE
Refills: 0 | Status: COMPLETED | OUTPATIENT
Start: 2018-09-28 | End: 2018-09-28

## 2018-09-28 RX ORDER — POTASSIUM PHOSPHATE, MONOBASIC POTASSIUM PHOSPHATE, DIBASIC 236; 224 MG/ML; MG/ML
15 INJECTION, SOLUTION INTRAVENOUS ONCE
Refills: 0 | Status: DISCONTINUED | OUTPATIENT
Start: 2018-09-28 | End: 2018-09-28

## 2018-09-28 RX ORDER — POTASSIUM CHLORIDE 20 MEQ
20 PACKET (EA) ORAL ONCE
Refills: 0 | Status: COMPLETED | OUTPATIENT
Start: 2018-09-28 | End: 2018-09-28

## 2018-09-28 RX ORDER — SODIUM,POTASSIUM PHOSPHATES 278-250MG
1 POWDER IN PACKET (EA) ORAL ONCE
Refills: 0 | Status: DISCONTINUED | OUTPATIENT
Start: 2018-09-28 | End: 2018-09-28

## 2018-09-28 RX ADMIN — Medication 1000 MILLIGRAM(S): at 04:20

## 2018-09-28 RX ADMIN — Medication 3 MILLIGRAM(S): at 22:57

## 2018-09-28 RX ADMIN — Medication 1 PATCH: at 12:33

## 2018-09-28 RX ADMIN — LOSARTAN POTASSIUM 50 MILLIGRAM(S): 100 TABLET, FILM COATED ORAL at 05:42

## 2018-09-28 RX ADMIN — ONDANSETRON 4 MILLIGRAM(S): 8 TABLET, FILM COATED ORAL at 14:45

## 2018-09-28 RX ADMIN — Medication 400 MILLIGRAM(S): at 03:50

## 2018-09-28 RX ADMIN — ESCITALOPRAM OXALATE 20 MILLIGRAM(S): 10 TABLET, FILM COATED ORAL at 12:33

## 2018-09-28 RX ADMIN — Medication 20 MILLIEQUIVALENT(S): at 14:45

## 2018-09-28 RX ADMIN — PANTOPRAZOLE SODIUM 40 MILLIGRAM(S): 20 TABLET, DELAYED RELEASE ORAL at 05:42

## 2018-09-28 RX ADMIN — ENOXAPARIN SODIUM 40 MILLIGRAM(S): 100 INJECTION SUBCUTANEOUS at 12:33

## 2018-09-28 RX ADMIN — Medication 0.25 MILLIGRAM(S): at 14:44

## 2018-09-28 RX ADMIN — Medication 0.25 MILLIGRAM(S): at 22:56

## 2018-09-28 RX ADMIN — Medication 0.25 MILLIGRAM(S): at 05:42

## 2018-09-28 RX ADMIN — Medication 83.33 MILLIMOLE(S): at 16:17

## 2018-09-28 RX ADMIN — ONDANSETRON 4 MILLIGRAM(S): 8 TABLET, FILM COATED ORAL at 09:09

## 2018-09-28 NOTE — PROGRESS NOTE ADULT - ATTENDING COMMENTS
seen and examined 09-28-18 @ 0925    mild nausea  bilious vomiting last night, but none today  loose stools, but no longer watery    afeb  AVSS  abd soft / mild epigastric tenderness / ND      There does not seem to be a after discussing with GI and her PMD, unclear etiology to explain all of her symptoms discussed yesterday, but seen and examined 09-28-18 @ 0925    mild nausea  bilious vomiting last night, but none today  loose stools, but no longer watery    afeb  AVSS  abd soft / mild epigastric tenderness / ND    There does not seem to be a diagnosis to explain her symptoms. After discussing with GI and her PMD, it seams that there are chronic medical problems that could explain most except the unintentional weight loss.  Her current GI symptoms that brought her to the ER are nearly resolved, except from some nausea and dyspepsia.    -start Maalox and PPI  -D/C home when tolerating a light diet  -f/u with Dr Armas (PMD) as outpatient  -f/u with [facility] about significant weight loss and concern for recurrent malignancy

## 2018-09-28 NOTE — PROGRESS NOTE ADULT - SUBJECTIVE AND OBJECTIVE BOX
Chief Complaint:  Patient is a 48y old  Female who presents with a chief complaint of acute cholecystitis (28 Sep 2018 13:59)      Interval Events: Patient seen and examined at bedside. She states that she no longer has abdominal pain, only moderately if she palpates the RUQ. Surgery recommends no surgical intervention at this time. She has not had any blood in her bowel movement this morning (minimal stool). Previous EGD, colonoscopy, small bowel enteroscopy results will be obtained today from Dr. Mendelsohn. Of note the patient recently changed her last name from Lenka to Naun.    Allergies:  No Known Allergies      Home Medications:    Hospital Medications:  ALPRAZolam 0.25 milliGRAM(s) Oral three times a day  aluminum hydroxide/magnesium hydroxide/simethicone Suspension 30 milliLiter(s) Oral every 4 hours PRN  enoxaparin Injectable 40 milliGRAM(s) SubCutaneous daily  escitalopram 20 milliGRAM(s) Oral daily  influenza   Vaccine 0.5 milliLiter(s) IntraMuscular once  losartan 50 milliGRAM(s) Oral daily  melatonin 3 milliGRAM(s) Oral at bedtime  nicotine -   7 mG/24Hr(s) Patch 1 patch Transdermal daily  ondansetron Injectable 4 milliGRAM(s) IV Push every 6 hours PRN  pantoprazole    Tablet 40 milliGRAM(s) Oral before breakfast  sodium phosphate IVPB 30 milliMole(s) IV Intermittent once      PMHX/PSHX:  Breast cancer  Depression  Hiatal hernia  GERD (gastroesophageal reflux disease)  Carcinoma  Lymphoma  Sarcoma  H/O osteosarcoma  H/O mastectomy, bilateral      Family history:      ROS:     General:  No wt loss, fevers, chills, night sweats, fatigue,   Eyes:  Good vision, no reported pain  ENT:  No sore throat, pain, runny nose, dysphagia  CV:  No pain, palpitations, hypo/hypertension  Resp:  No dyspnea, cough, tachypnea, wheezing  GI:  Mild RUQ pain, No nausea, No vomiting, No diarrhea, No constipation, No weight loss, No fever, No pruritis, No rectal bleeding, No tarry stools, No dysphagia,  :  No pain, bleeding, incontinence, nocturia  Muscle:  No pain, weakness  Neuro:  No weakness, tingling, memory problems  Psych:  No fatigue, insomnia, mood problems, depression  Endocrine:  No polyuria, polydipsia, cold/heat intolerance  Heme:  No petechiae, ecchymosis, easy bruisability  Skin:  No rash, tattoos, scars, edema      PHYSICAL EXAM:   Vital Signs:  Vital Signs Last 24 Hrs  T(C): 37.3 (28 Sep 2018 14:29), Max: 37.3 (28 Sep 2018 14:29)  T(F): 99.1 (28 Sep 2018 14:29), Max: 99.1 (28 Sep 2018 14:29)  HR: 66 (28 Sep 2018 14:29) (65 - 75)  BP: 163/95 (28 Sep 2018 14:29) (125/76 - 163/95)  BP(mean): --  RR: 18 (28 Sep 2018 14:29) (18 - 18)  SpO2: 98% (28 Sep 2018 14:29) (96% - 98%)  Daily     Daily     GENERAL:  Appears stated age, well-groomed, well-nourished, no distress  HEENT:  NC/AT,  conjunctivae clear and pink, no thyromegaly, nodules, adenopathy, no JVD, sclera -anicteric  CHEST:  Full & symmetric excursion, no increased effort, breath sounds clear  HEART:  Regular rhythm, S1, S2, no murmur/rub/S3/S4, no abdominal bruit, no edema  ABDOMEN:  Soft, non-tender, non-distended, normoactive bowel sounds,  no masses ,no hepato-splenomegaly, no signs of chronic liver disease. Hemorrhoids present on rectal exam  EXTEREMITIES:  no cyanosis,clubbing or edema  SKIN:  No rash/erythema/ecchymoses/petechiae/wounds/abscess/warm/dry  NEURO:  Alert, oriented, no asterixis, no tremor, no encephalopathy    LABS:                        11.7   7.30  )-----------( 217      ( 28 Sep 2018 08:35 )             34.3     09-28    137  |  103  |  <4<L>  ----------------------------<  105<H>  3.8   |  21<L>  |  0.66    Ca    8.9      28 Sep 2018 07:10  Phos  1.8     09-28  Mg     2.2     09-28    TPro  6.4  /  Alb  3.5  /  TBili  0.1<L>  /  DBili  <0.1  /  AST  26  /  ALT  11  /  AlkPhos  80  09-27    LIVER FUNCTIONS - ( 27 Sep 2018 07:15 )  Alb: 3.5 g/dL / Pro: 6.4 g/dL / ALK PHOS: 80 U/L / ALT: 11 U/L / AST: 26 U/L / GGT: x                   Imaging: Chief Complaint:  Patient is a 48y old  Female who presents with a chief complaint of acute cholecystitis (28 Sep 2018 13:59)      Interval Events:   Patient seen and examined at bedside. She states that she no longer has abdominal pain, only moderately if she palpates the RUQ. Surgery recommends no surgical intervention at this time. She continues to have bloody BM, however minimal stool, but overall less blood than previously. Previous EGD, colonoscopy, small bowel enteroscopy results will be obtained today from Dr. Mendelsohn. Of note the patient recently changed her last name from Lenka to Naun.    Allergies:  No Known Allergies      Home Medications:    Hospital Medications:  ALPRAZolam 0.25 milliGRAM(s) Oral three times a day  aluminum hydroxide/magnesium hydroxide/simethicone Suspension 30 milliLiter(s) Oral every 4 hours PRN  enoxaparin Injectable 40 milliGRAM(s) SubCutaneous daily  escitalopram 20 milliGRAM(s) Oral daily  influenza   Vaccine 0.5 milliLiter(s) IntraMuscular once  losartan 50 milliGRAM(s) Oral daily  melatonin 3 milliGRAM(s) Oral at bedtime  nicotine -   7 mG/24Hr(s) Patch 1 patch Transdermal daily  ondansetron Injectable 4 milliGRAM(s) IV Push every 6 hours PRN  pantoprazole    Tablet 40 milliGRAM(s) Oral before breakfast  sodium phosphate IVPB 30 milliMole(s) IV Intermittent once      PMHX/PSHX:  Breast cancer  Depression  Hiatal hernia  GERD (gastroesophageal reflux disease)  Carcinoma  Lymphoma  Sarcoma  H/O osteosarcoma  H/O mastectomy, bilateral      Family history:      ROS:     General:  No wt loss, fevers, chills, night sweats, fatigue,   Eyes:  Good vision, no reported pain  ENT:  No sore throat, pain, runny nose, dysphagia  CV:  No pain, palpitations, hypo/hypertension  Resp:  No dyspnea, cough, tachypnea, wheezing  GI:  Mild RUQ pain, No nausea, No vomiting, No diarrhea, No constipation, No weight loss, No fever, No pruritis, No rectal bleeding, No tarry stools, No dysphagia,  :  No pain, bleeding, incontinence, nocturia  Muscle:  No pain, weakness  Neuro:  No weakness, tingling, memory problems  Psych:  No fatigue, insomnia, mood problems, depression  Endocrine:  No polyuria, polydipsia, cold/heat intolerance  Heme:  No petechiae, ecchymosis, easy bruisability  Skin:  No rash, tattoos, scars, edema      PHYSICAL EXAM:   Vital Signs:  Vital Signs Last 24 Hrs  T(C): 37.3 (28 Sep 2018 14:29), Max: 37.3 (28 Sep 2018 14:29)  T(F): 99.1 (28 Sep 2018 14:29), Max: 99.1 (28 Sep 2018 14:29)  HR: 66 (28 Sep 2018 14:29) (65 - 75)  BP: 163/95 (28 Sep 2018 14:29) (125/76 - 163/95)  BP(mean): --  RR: 18 (28 Sep 2018 14:29) (18 - 18)  SpO2: 98% (28 Sep 2018 14:29) (96% - 98%)  Daily     Daily     GENERAL:  Appears stated age, well-groomed, well-nourished, no distress  HEENT:  NC/AT,  conjunctivae clear and pink, no thyromegaly, nodules, adenopathy, no JVD, sclera -anicteric  CHEST:  Full & symmetric excursion, no increased effort, breath sounds clear  HEART:  Regular rhythm, S1, S2, no murmur/rub/S3/S4, no abdominal bruit, no edema  ABDOMEN:  Soft, non-tender, non-distended, normoactive bowel sounds,  no masses ,no hepato-splenomegaly, no signs of chronic liver disease. Hemorrhoids present on rectal exam  EXTEREMITIES:  no cyanosis,clubbing or edema  SKIN:  No rash/erythema/ecchymoses/petechiae/wounds/abscess/warm/dry  NEURO:  Alert, oriented, no asterixis, no tremor, no encephalopathy    LABS:                        11.7   7.30  )-----------( 217      ( 28 Sep 2018 08:35 )             34.3     09-28    137  |  103  |  <4<L>  ----------------------------<  105<H>  3.8   |  21<L>  |  0.66    Ca    8.9      28 Sep 2018 07:10  Phos  1.8     09-28  Mg     2.2     09-28    TPro  6.4  /  Alb  3.5  /  TBili  0.1<L>  /  DBili  <0.1  /  AST  26  /  ALT  11  /  AlkPhos  80  09-27    LIVER FUNCTIONS - ( 27 Sep 2018 07:15 )  Alb: 3.5 g/dL / Pro: 6.4 g/dL / ALK PHOS: 80 U/L / ALT: 11 U/L / AST: 26 U/L / GGT: x                   Imaging: Chief Complaint:  Patient is a 48y old  Female who presents with a chief complaint of acute cholecystitis (28 Sep 2018 13:59)      Interval Events:   Patient seen and examined at bedside. She states that she no longer has abdominal pain, only moderately if she palpates the RUQ. Surgery recommends no surgical intervention at this time. She continues to have bright red blood per rectum, however minimal stool, but overall less blood than previously. Previous EGD, colonoscopy, small bowel capsule endoscopy results will be obtained today from Dr. Mendelsohn. Of note the patient recently changed her last name from Lenka to Naun.    Allergies:  No Known Allergies      Home Medications:    Hospital Medications:  ALPRAZolam 0.25 milliGRAM(s) Oral three times a day  aluminum hydroxide/magnesium hydroxide/simethicone Suspension 30 milliLiter(s) Oral every 4 hours PRN  enoxaparin Injectable 40 milliGRAM(s) SubCutaneous daily  escitalopram 20 milliGRAM(s) Oral daily  influenza   Vaccine 0.5 milliLiter(s) IntraMuscular once  losartan 50 milliGRAM(s) Oral daily  melatonin 3 milliGRAM(s) Oral at bedtime  nicotine -   7 mG/24Hr(s) Patch 1 patch Transdermal daily  ondansetron Injectable 4 milliGRAM(s) IV Push every 6 hours PRN  pantoprazole    Tablet 40 milliGRAM(s) Oral before breakfast  sodium phosphate IVPB 30 milliMole(s) IV Intermittent once      PMHX/PSHX:  Breast cancer  Depression  Hiatal hernia  GERD (gastroesophageal reflux disease)  Carcinoma  Lymphoma  Sarcoma  H/O osteosarcoma  H/O mastectomy, bilateral      Family history:      ROS:     General:  No wt loss, fevers, chills, night sweats, fatigue,   Eyes:  Good vision, no reported pain  ENT:  No sore throat, pain, runny nose, dysphagia  CV:  No pain, palpitations, hypo/hypertension  Resp:  No dyspnea, cough, tachypnea, wheezing  GI:  Mild RUQ pain, No nausea, No vomiting, No diarrhea, No constipation, No weight loss, No fever, No pruritis, No rectal bleeding, No tarry stools, No dysphagia,  :  No pain, bleeding, incontinence, nocturia  Muscle:  No pain, weakness  Neuro:  No weakness, tingling, memory problems  Psych:  No fatigue, insomnia, mood problems, depression  Endocrine:  No polyuria, polydipsia, cold/heat intolerance  Heme:  No petechiae, ecchymosis, easy bruisability  Skin:  No rash, tattoos, scars, edema      PHYSICAL EXAM:   Vital Signs:  Vital Signs Last 24 Hrs  T(C): 37.3 (28 Sep 2018 14:29), Max: 37.3 (28 Sep 2018 14:29)  T(F): 99.1 (28 Sep 2018 14:29), Max: 99.1 (28 Sep 2018 14:29)  HR: 66 (28 Sep 2018 14:29) (65 - 75)  BP: 163/95 (28 Sep 2018 14:29) (125/76 - 163/95)  BP(mean): --  RR: 18 (28 Sep 2018 14:29) (18 - 18)  SpO2: 98% (28 Sep 2018 14:29) (96% - 98%)  Daily     Daily     GENERAL:  Appears stated age, well-groomed, well-nourished, no distress  HEENT:  NC/AT,  conjunctivae clear and pink, no thyromegaly, nodules, adenopathy, no JVD, sclera -anicteric  CHEST:  Full & symmetric excursion, no increased effort, breath sounds clear  HEART:  Regular rhythm, S1, S2, no murmur/rub/S3/S4, no abdominal bruit, no edema  ABDOMEN:  Soft, non-tender, non-distended, normoactive bowel sounds,  no masses ,no hepato-splenomegaly, no signs of chronic liver disease. Hemorrhoids present on rectal exam  EXTEREMITIES:  no cyanosis,clubbing or edema  SKIN:  No rash/erythema/ecchymoses/petechiae/wounds/abscess/warm/dry  NEURO:  Alert, oriented, no asterixis, no tremor, no encephalopathy    LABS:                        11.7   7.30  )-----------( 217      ( 28 Sep 2018 08:35 )             34.3     09-28    137  |  103  |  <4<L>  ----------------------------<  105<H>  3.8   |  21<L>  |  0.66    Ca    8.9      28 Sep 2018 07:10  Phos  1.8     09-28  Mg     2.2     09-28    TPro  6.4  /  Alb  3.5  /  TBili  0.1<L>  /  DBili  <0.1  /  AST  26  /  ALT  11  /  AlkPhos  80  09-27    LIVER FUNCTIONS - ( 27 Sep 2018 07:15 )  Alb: 3.5 g/dL / Pro: 6.4 g/dL / ALK PHOS: 80 U/L / ALT: 11 U/L / AST: 26 U/L / GGT: x                   Imaging:

## 2018-09-28 NOTE — PROGRESS NOTE ADULT - ASSESSMENT
Patient is a 49 yo F with abd pain and HIDA concerning for possible choledocholithiasis, and bloody bowel movements    #Lower GI bleed 2/2 internal hemorrhoids vs diverticulosis vs malignancy  -H/H stable 11.7/37.3, continue to trend  -Diagnosed internal hemorrhoids as per patient  -[place] [facility] [place], diverticulosis. EGD and enteroscopy showed no significant findings 2017. Video capsule on 1/8/18 showed multiple angioectasias (some with active bleeding) throughout the small intestine. H.pylori and celiac testing at that time was negative  -[facility] colonoscopy from 2013 showed hemorrhoids and diverticulosis  -If with continued rectal bleed, Will consider colonoscopy    #Acute cholecystitis/ choledocholithiasis  -Being managed by surgery Dr. Pool  -CT scan, US and HIDA suggestive of cholecystitis/ choledocholithiasis  -MRCP showed mild wall edema and small calculi  -No surgical intervention at this time as per surgery team  -Advanced diet as tolerated, avoiding lactose Patient is a 47 yo F with abd pain and HIDA concerning for possible choledocholithiasis, and bloody bowel movements    #Lower GI bleed 2/2 internal hemorrhoids   -H/H stable 11.7/37.3, continue to trend  -Diagnosed internal hemorrhoids as per patient  -Obtained colonoscopy from [facility] [place] showed internal/external hemorrhoids, diverticulosis. EGD and enteroscopy showed no significant findings 2017. Video capsule on 1/8/18 showed multiple angioectasias (some with active bleeding) throughout the small intestine. H.pylori and celiac testing at that time was negative  -[facility] colonoscopy from 2013 showed hemorrhoids and diverticulosis  -If with continued rectal bleed, Will consider colonoscopy    #Acute cholecystitis/ choledocholithiasis  -Being managed by surgery Dr. Pool  -CT scan, US and HIDA suggestive of cholecystitis/ choledocholithiasis  -MRCP showed mild wall edema and small calculi  -No surgical intervention at this time as per surgery team  -Advanced diet as tolerated, avoiding lactose

## 2018-09-28 NOTE — PROGRESS NOTE ADULT - SUBJECTIVE AND OBJECTIVE BOX
GENERAL SURGERY DAILY PROGRESS NOTE:       Subjective:  c/o and nausea and vomiting yesterday  +diarrhea  abd pain improving      Objective:  NAD  abd soft, NTND  no rebound no guarding      MEDICATIONS  (STANDING):  ALPRAZolam 0.25 milliGRAM(s) Oral three times a day  dextrose 5% + sodium chloride 0.45% with potassium chloride 20 mEq/L 1000 milliLiter(s) (100 mL/Hr) IV Continuous <Continuous>  enoxaparin Injectable 40 milliGRAM(s) SubCutaneous daily  escitalopram 20 milliGRAM(s) Oral daily  influenza   Vaccine 0.5 milliLiter(s) IntraMuscular once  losartan 50 milliGRAM(s) Oral daily  melatonin 3 milliGRAM(s) Oral at bedtime  nicotine -   7 mG/24Hr(s) Patch 1 patch Transdermal daily  pantoprazole    Tablet 40 milliGRAM(s) Oral before breakfast  sodium phosphate IVPB 30 milliMole(s) IV Intermittent once    MEDICATIONS  (PRN):  aluminum hydroxide/magnesium hydroxide/simethicone Suspension 30 milliLiter(s) Oral every 4 hours PRN Dyspepsia      Vital Signs Last 24 Hrs  T(C): 36.9 (28 Sep 2018 09:20), Max: 37.1 (27 Sep 2018 14:08)  T(F): 98.4 (28 Sep 2018 09:20), Max: 98.7 (27 Sep 2018 14:08)  HR: 65 (28 Sep 2018 09:20) (65 - 75)  BP: 134/88 (28 Sep 2018 09:20) (125/76 - 146/88)  BP(mean): --  RR: 18 (28 Sep 2018 09:20) (18 - 18)  SpO2: 98% (28 Sep 2018 09:20) (96% - 98%)    I&O's Detail    27 Sep 2018 07:01  -  28 Sep 2018 07:00  --------------------------------------------------------  IN:    dextrose 5% + sodium chloride 0.45% with potassium chloride 20 mEq/L: 1200 mL  Total IN: 1200 mL    OUT:  Total OUT: 0 mL    Total NET: 1200 mL      28 Sep 2018 07:01  -  28 Sep 2018 09:47  --------------------------------------------------------  IN:  Total IN: 0 mL    OUT:  Total OUT: 0 mL    Total NET: 0 mL          Daily     Daily     LABS:                        11.7   7.30  )-----------( 217      ( 28 Sep 2018 08:35 )             34.3     09-28    137  |  103  |  <4<L>  ----------------------------<  105<H>  3.8   |  21<L>  |  0.66    Ca    8.9      28 Sep 2018 07:10  Phos  1.8     09-28  Mg     2.2     09-28    TPro  6.4  /  Alb  3.5  /  TBili  0.1<L>  /  DBili  <0.1  /  AST  26  /  ALT  11  /  AlkPhos  80  09-27

## 2018-09-28 NOTE — PROGRESS NOTE ADULT - ASSESSMENT
A/P  48F with abd pain, BRBPR  -will f/u with GI re: plan for possible scope  -if no scope, trial of clears  -Dr. Armas to see pt today, possible transfer to medicine  -AM labs  -OOB/amb  -pain control    JOSE Du PA-C , pager # [phone]

## 2018-09-29 ENCOUNTER — TRANSCRIPTION ENCOUNTER (OUTPATIENT)
Age: 48
End: 2018-09-29

## 2018-09-29 VITALS
TEMPERATURE: 99 F | HEART RATE: 96 BPM | RESPIRATION RATE: 18 BRPM | OXYGEN SATURATION: 97 % | DIASTOLIC BLOOD PRESSURE: 83 MMHG | SYSTOLIC BLOOD PRESSURE: 116 MMHG

## 2018-09-29 PROCEDURE — 99231 SBSQ HOSP IP/OBS SF/LOW 25: CPT

## 2018-09-29 PROCEDURE — 93010 ELECTROCARDIOGRAM REPORT: CPT

## 2018-09-29 RX ORDER — ACETAMINOPHEN 500 MG
1000 TABLET ORAL ONCE
Refills: 0 | Status: COMPLETED | OUTPATIENT
Start: 2018-09-29 | End: 2018-09-29

## 2018-09-29 RX ADMIN — ONDANSETRON 4 MILLIGRAM(S): 8 TABLET, FILM COATED ORAL at 08:39

## 2018-09-29 RX ADMIN — PANTOPRAZOLE SODIUM 40 MILLIGRAM(S): 20 TABLET, DELAYED RELEASE ORAL at 05:40

## 2018-09-29 RX ADMIN — LOSARTAN POTASSIUM 50 MILLIGRAM(S): 100 TABLET, FILM COATED ORAL at 05:40

## 2018-09-29 RX ADMIN — Medication 30 MILLILITER(S): at 04:15

## 2018-09-29 RX ADMIN — Medication 400 MILLIGRAM(S): at 04:15

## 2018-09-29 RX ADMIN — Medication 0.25 MILLIGRAM(S): at 05:40

## 2018-09-29 RX ADMIN — Medication 1 PATCH: at 11:30

## 2018-09-29 NOTE — DISCHARGE NOTE ADULT - HOSPITAL COURSE
9/26 - admitted to [facility] surgery service with sx of cholecystitis and GI bleed. Pt put on NPO, IVF, IV Abx  9/27 - HIDA showed concerning signs of CBD obstruction, but LFT wnl, and MRCP showed no CBD stones or dilatation  9/28 - GI consulted, noted PMH diagnosed hemorrhoids internal, no intervention unless continued rectal bleed. Pt put back on low residue diet  9/29 - started Maalox and PPI per pt's sx, pt tolerating low residue diet well. Pt sx well controlled, hemodynamically stable, ready for discharge home.

## 2018-09-29 NOTE — PROVIDER CONTACT NOTE (OTHER) - SITUATION
Pt c/o chest pain, sharp when taking a deep breath. Constant since yesterday 9/27.
Pt with BRBPR and emesis
Patient in bathroom reporting BRB per rectum. Pt reported feeling lightheaded.
c/o chest pain

## 2018-09-29 NOTE — PROVIDER CONTACT NOTE (OTHER) - ASSESSMENT
/93 HR 73 RR 18 O2 Sat 98%
Pt with second episode of BRBPR since 1900 9/26/18. Pt also had an episode of emesis. Pt verbalized to RN that she has been having multiple episodes of BRBPR for the past 2 days. Pt also verbalized that she thinks it could be due to her hemorrhoids. Pt c/o slight lightheadedness.
Patient in bathroom reporting BRB per rectum. Pt reported feeling lightheaded. BRB drainage noted in toilet. /116, HR 87.
BP elevated 153/103. All other VS normal.

## 2018-09-29 NOTE — PROVIDER CONTACT NOTE (OTHER) - ACTION/TREATMENT ORDERED:
MD at bedside. EKG and labs ordered. Continue to monitor.
MD José Montgomery in patient's room, BRB drainage noted by MD. MD in patient's room during VS. As per MD will place orders for CBC & coag labs. Repeat BP when pt in bed resting. Will continue to monitor.
MD notified. Pt is already getting serial CBCs every 4 hours. H&H remains stable. No further interventions at this time. Will continue to monitor.
perform EKG, cardiac enzymes in the morning w/ labs, maalox order to be given and will write rx for IV tylenol

## 2018-09-29 NOTE — DISCHARGE NOTE ADULT - CARE PROVIDERS DIRECT ADDRESSES
[email or web address]

## 2018-09-29 NOTE — DISCHARGE NOTE ADULT - PLAN OF CARE
Please follow up with Dr. Leon within 2 weeks. Please call the office to make an appointment [phone]. Please follow up with Dr. Palacios within 2 weeks. Please call the office to make an appointment [phone] wound healing symptom control/monitor

## 2018-09-29 NOTE — DISCHARGE NOTE ADULT - CARE PROVIDER_API CALL
Savannah Leon), Gastroenterology  [address] [place] [id number]  Phone: [phone]  Fax: [phone]    Ubaldo Palacios), Surgery; Surgical Critical Care  [address] [place] [id number]  Phone: [phone]  Fax: [phone]

## 2018-09-29 NOTE — PROVIDER CONTACT NOTE (OTHER) - BACKGROUND
9/26 abdominal pain
9/28 cholecystitis
adm dx: Abd pain, acute raymundo
9/26/18 Admitted for acute raymundo

## 2018-09-29 NOTE — DISCHARGE NOTE ADULT - PATIENT PORTAL LINK FT
You can access the [facility] Portal, offered by [facility], by registering with the following website: [email or web address]

## 2018-09-29 NOTE — DISCHARGE NOTE ADULT - CARE PLAN
Principal Discharge DX:	Cholecystitis  Goal:	symptom control/monitor  Assessment and plan of treatment:	Please follow up with Dr. Palacios within 2 weeks. Please call the office to make an appointment [phone]  Secondary Diagnosis:	Hematochezia  Goal:	wound healing  Assessment and plan of treatment:	Please follow up with Dr. Leon within 2 weeks. Please call the office to make an appointment [phone].

## 2018-09-29 NOTE — PROGRESS NOTE ADULT - ATTENDING COMMENTS
seen and examined 09-29-18 @ 0810    tolerating regular diet  no abdominal pain, nausea or vomiting  c/o GERD improved with Maalox    afeb  AVSS  abd soft / NT / ND    GI symptoms have resolved except GERD  -D/C home  -continue PPI  -I recommended Maalox or similar OTC  -she clarified this morning that she has 30 lbs weight gain over 5 months (not weight loss). so no need   -f/u with Dr Armas (PMD) as outpatient

## 2018-10-18 ENCOUNTER — APPOINTMENT (OUTPATIENT)
Dept: TRAUMA SURGERY | Facility: CLINIC | Age: 48
End: 2018-10-18

## 2018-10-22 ENCOUNTER — APPOINTMENT (OUTPATIENT)
Dept: GASTROENTEROLOGY | Facility: CLINIC | Age: 48
End: 2018-10-22

## 2019-06-16 ENCOUNTER — EMERGENCY (EMERGENCY)
Facility: HOSPITAL | Age: 49
LOS: 1 days | Discharge: HOME | End: 2019-06-16
Attending: EMERGENCY MEDICINE
Payer: MEDICAID

## 2019-06-16 VITALS
TEMPERATURE: 98 F | HEIGHT: 62.5 IN | SYSTOLIC BLOOD PRESSURE: 116 MMHG | WEIGHT: 171.96 LBS | RESPIRATION RATE: 18 BRPM | OXYGEN SATURATION: 99 % | DIASTOLIC BLOOD PRESSURE: 82 MMHG | HEART RATE: 83 BPM

## 2019-06-16 DIAGNOSIS — Z90.13 ACQUIRED ABSENCE OF BILATERAL BREASTS AND NIPPLES: Chronic | ICD-10-CM

## 2019-06-16 DIAGNOSIS — Z85.830 PERSONAL HISTORY OF MALIGNANT NEOPLASM OF BONE: Chronic | ICD-10-CM

## 2019-06-16 PROCEDURE — 99283 EMERGENCY DEPT VISIT LOW MDM: CPT

## 2019-06-16 PROCEDURE — 73564 X-RAY EXAM KNEE 4 OR MORE: CPT | Mod: 26,LT

## 2019-06-16 RX ORDER — CEPHALEXIN 500 MG
500 CAPSULE ORAL ONCE
Refills: 0 | Status: COMPLETED | OUTPATIENT
Start: 2019-06-16 | End: 2019-06-16

## 2019-06-16 RX ORDER — CEPHALEXIN 500 MG
1 CAPSULE ORAL
Qty: 14 | Refills: 0
Start: 2019-06-16 | End: 2019-06-22

## 2019-06-16 RX ORDER — IBUPROFEN 200 MG
600 TABLET ORAL ONCE
Refills: 0 | Status: COMPLETED | OUTPATIENT
Start: 2019-06-16 | End: 2019-06-16

## 2019-06-16 RX ADMIN — Medication 500 MILLIGRAM(S): at 14:33

## 2019-06-16 RX ADMIN — Medication 600 MILLIGRAM(S): at 13:48

## 2019-06-16 NOTE — ED PROVIDER NOTE - NSFOLLOWUPINSTRUCTIONS_ED_ALL_ED_FT
1. Return to ED for worsening, progressive or any other concerning symptoms   2. Follow up with your primary care doctor in 2-3days  3. Take motrin 600mg every 6 hours as needed for pain  4. Follow up with orthopedist. Keep knee in immobilizer or ace for support.

## 2019-06-16 NOTE — ED PROVIDER NOTE - NS ED ROS FT
CONSTITUTIONAL: No fevers, no chills  Eyes: no visual changes  Ears: no ear drainage, no ear pain  Nose: no nasal congestion  Mouth/Throat: no sore throat  Cardiovascular: No Chest pain  Respiratory: No SOB  Gastrointestinal: No n/v/d, no abd pain  Genitourinary: no dysuria, no hematuria  SKIN: no rashes.  MSK: knee pain

## 2019-06-16 NOTE — ED ADULT NURSE REASSESSMENT NOTE - NS ED NURSE REASSESS COMMENT FT1
xray neg. knee immobilizer applied pt given cane and demonstrated proper use. pt cleared for discharge.

## 2019-06-16 NOTE — ED PROVIDER NOTE - ATTENDING CONTRIBUTION TO CARE
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 Lost balance hit her knee onto the ground and felt a pop. Pt has been ambulating but pain has progressively gotten worse. No neuro stx. No other injuries. On exam patchy, raised, erythematous area, increased warmth to medial aspect, full ROM of the knee, neurovascular intact. Diffuse R medial aspect TTP. Will get Xrays, likely treat for cellulitis, DC w [facility] f/u this week

## 2019-06-16 NOTE — ED ADULT NURSE NOTE - NSIMPLEMENTINTERV_GEN_ALL_ED
Implemented All Universal Safety Interventions:  Peck to call system. Call bell, personal items and telephone within reach. Instruct patient to call for assistance. Room bathroom lighting operational. Non-slip footwear when patient is off stretcher. Physically safe environment: no spills, clutter or unnecessary equipment. Stretcher in lowest position, wheels locked, appropriate side rails in place.

## 2019-06-16 NOTE — ED ADULT NURSE NOTE - PMH
Breast cancer  lymph nodes removal in left  Carcinoma    Depression    GERD (gastroesophageal reflux disease)    Hiatal hernia    HTN (hypertension)    Lymphoma  S/p chemotherapy  Sarcoma

## 2019-06-16 NOTE — ED PROVIDER NOTE - CLINICAL SUMMARY MEDICAL DECISION MAKING FREE TEXT BOX
Left knee pain s/p trauma. Neurovascular intact (besides residual numbness on medial aspect of knee). Will xray  for possible fx. Place in knee immobilizer with ortho follow up.

## 2019-06-16 NOTE — ED PROVIDER NOTE - OBJECTIVE STATEMENT
48 YOF pmh left osteosarcoma s/p resection p/w left knee pain s/p slip and fall onto left knee 2 days ago. Pt states she hit the medial aspect of her knee onto the ground and felt a pop. Pt has been ambulating on the leg since then but pain has progressively gotten worse. No new numbness to feet or leg besides residual from osteosarcoma surgery. No other injuries present.

## 2019-06-16 NOTE — ED ADULT NURSE NOTE - OBJECTIVE STATEMENT
pt to room 3 via wheel chair c/o L kinee pain swelling. s/p fall a ew days ago .L knee swollen tender dec rom medial aspect of knee red and warm pt denies fever chills. pt with h/o resected sarcoma in past.pt med as orderd. xray completed.

## 2019-06-16 NOTE — ED PROVIDER NOTE - MUSCULOSKELETAL, MLM
Left medial knee edema and tenderness with ertyhema present. pt able to fully extend knee without difficulty. quadriceps and patellar tendon intact

## 2019-06-27 ENCOUNTER — APPOINTMENT (OUTPATIENT)
Dept: ORTHOPEDIC SURGERY | Facility: CLINIC | Age: 49
End: 2019-06-27
Payer: MEDICAID

## 2019-06-27 VITALS — HEART RATE: 76 BPM | SYSTOLIC BLOOD PRESSURE: 125 MMHG | DIASTOLIC BLOOD PRESSURE: 82 MMHG

## 2019-06-27 VITALS — HEIGHT: 63 IN | BODY MASS INDEX: 30.65 KG/M2 | WEIGHT: 173 LBS

## 2019-06-27 DIAGNOSIS — Z78.9 OTHER SPECIFIED HEALTH STATUS: ICD-10-CM

## 2019-06-27 DIAGNOSIS — Z82.62 FAMILY HISTORY OF OSTEOPOROSIS: ICD-10-CM

## 2019-06-27 DIAGNOSIS — Z85.830 PERSONAL HISTORY OF MALIGNANT NEOPLASM OF BONE: ICD-10-CM

## 2019-06-27 DIAGNOSIS — Z86.79 PERSONAL HISTORY OF OTHER DISEASES OF THE CIRCULATORY SYSTEM: ICD-10-CM

## 2019-06-27 DIAGNOSIS — Z85.9 PERSONAL HISTORY OF MALIGNANT NEOPLASM, UNSPECIFIED: ICD-10-CM

## 2019-06-27 DIAGNOSIS — M25.562 PAIN IN LEFT KNEE: ICD-10-CM

## 2019-06-27 DIAGNOSIS — Z82.61 FAMILY HISTORY OF ARTHRITIS: ICD-10-CM

## 2019-06-27 DIAGNOSIS — Z85.72 PERSONAL HISTORY OF NON-HODGKIN LYMPHOMAS: ICD-10-CM

## 2019-06-27 PROCEDURE — 99204 OFFICE O/P NEW MOD 45 MIN: CPT

## 2019-06-27 PROCEDURE — 73562 X-RAY EXAM OF KNEE 3: CPT | Mod: LT

## 2019-06-27 PROCEDURE — 73590 X-RAY EXAM OF LOWER LEG: CPT | Mod: LT

## 2019-06-27 RX ORDER — DICLOFENAC SODIUM 75 MG/1
75 TABLET, DELAYED RELEASE ORAL
Qty: 1 | Refills: 1 | Status: ACTIVE | COMMUNITY
Start: 2019-06-27 | End: 1900-01-01

## 2019-06-28 RX ORDER — DICLOFENAC SODIUM 75 MG/1
75 TABLET, DELAYED RELEASE ORAL
Qty: 1 | Refills: 1 | Status: ACTIVE | COMMUNITY
Start: 2019-06-28 | End: 1900-01-01

## 2019-06-30 ENCOUNTER — FORM ENCOUNTER (OUTPATIENT)
Age: 49
End: 2019-06-30

## 2019-07-01 ENCOUNTER — OUTPATIENT (OUTPATIENT)
Dept: OUTPATIENT SERVICES | Facility: HOSPITAL | Age: 49
LOS: 1 days | End: 2019-07-01
Payer: MEDICAID

## 2019-07-01 ENCOUNTER — APPOINTMENT (OUTPATIENT)
Dept: MRI IMAGING | Facility: CLINIC | Age: 49
End: 2019-07-01
Payer: MEDICAID

## 2019-07-01 DIAGNOSIS — Z90.13 ACQUIRED ABSENCE OF BILATERAL BREASTS AND NIPPLES: Chronic | ICD-10-CM

## 2019-07-01 DIAGNOSIS — Z85.830 PERSONAL HISTORY OF MALIGNANT NEOPLASM OF BONE: Chronic | ICD-10-CM

## 2019-07-01 DIAGNOSIS — Z00.8 ENCOUNTER FOR OTHER GENERAL EXAMINATION: ICD-10-CM

## 2019-07-01 PROBLEM — Z82.62 FAMILY HISTORY OF OSTEOPOROSIS: Status: ACTIVE | Noted: 2019-06-27

## 2019-07-01 PROBLEM — Z78.9 CURRENT NON-SMOKER: Status: ACTIVE | Noted: 2019-06-27

## 2019-07-01 PROBLEM — Z82.61 FAMILY HISTORY OF ARTHRITIS: Status: ACTIVE | Noted: 2019-06-27

## 2019-07-01 PROBLEM — Z86.79 HISTORY OF HYPERTENSION: Status: RESOLVED | Noted: 2019-06-27 | Resolved: 2019-07-01

## 2019-07-01 PROBLEM — Z85.72 HISTORY OF B-CELL LYMPHOMA: Status: RESOLVED | Noted: 2019-06-27 | Resolved: 2019-07-01

## 2019-07-01 PROBLEM — Z78.9 EXERCISES OCCASIONALLY: Status: ACTIVE | Noted: 2019-06-27

## 2019-07-01 PROBLEM — Z85.9 HISTORY OF CARCINOMA: Status: RESOLVED | Noted: 2019-06-27 | Resolved: 2019-07-01

## 2019-07-01 PROCEDURE — 73721 MRI JNT OF LWR EXTRE W/O DYE: CPT | Mod: 26,LT

## 2019-07-01 RX ORDER — VALSARTAN 80 MG/1
80 TABLET ORAL
Refills: 0 | Status: ACTIVE | COMMUNITY

## 2019-07-01 RX ORDER — ALPRAZOLAM 0.25 MG/1
0.25 TABLET ORAL
Refills: 0 | Status: ACTIVE | COMMUNITY

## 2019-07-01 RX ORDER — ONDANSETRON HYDROCHLORIDE 4 MG/1
4 TABLET, FILM COATED ORAL
Refills: 0 | Status: ACTIVE | COMMUNITY

## 2019-07-01 RX ORDER — ESCITALOPRAM OXALATE 20 MG/1
20 TABLET, FILM COATED ORAL
Refills: 0 | Status: ACTIVE | COMMUNITY

## 2019-07-01 RX ORDER — OMEPRAZOLE MAGNESIUM 20 MG/1
20 CAPSULE, DELAYED RELEASE ORAL
Refills: 0 | Status: ACTIVE | COMMUNITY

## 2019-07-03 ENCOUNTER — APPOINTMENT (OUTPATIENT)
Dept: ORTHOPEDIC SURGERY | Facility: CLINIC | Age: 49
End: 2019-07-03
Payer: MEDICAID

## 2019-07-03 VITALS
SYSTOLIC BLOOD PRESSURE: 111 MMHG | HEART RATE: 85 BPM | DIASTOLIC BLOOD PRESSURE: 74 MMHG | BODY MASS INDEX: 30.83 KG/M2 | HEIGHT: 63 IN | WEIGHT: 174 LBS

## 2019-07-03 DIAGNOSIS — M77.12 LATERAL EPICONDYLITIS, LEFT ELBOW: ICD-10-CM

## 2019-07-03 PROCEDURE — 99213 OFFICE O/P EST LOW 20 MIN: CPT

## 2019-07-10 ENCOUNTER — APPOINTMENT (OUTPATIENT)
Dept: ORTHOPEDIC SURGERY | Facility: CLINIC | Age: 49
End: 2019-07-10

## 2019-07-12 DIAGNOSIS — Z71.89 OTHER SPECIFIED COUNSELING: ICD-10-CM

## 2019-07-18 ENCOUNTER — APPOINTMENT (OUTPATIENT)
Dept: ORTHOPEDIC SURGERY | Facility: CLINIC | Age: 49
End: 2019-07-18

## 2019-07-26 ENCOUNTER — APPOINTMENT (OUTPATIENT)
Dept: ORTHOPEDIC SURGERY | Facility: CLINIC | Age: 49
End: 2019-07-26

## 2020-01-16 ENCOUNTER — APPOINTMENT (OUTPATIENT)
Dept: ORTHOPEDIC SURGERY | Facility: CLINIC | Age: 50
End: 2020-01-16
Payer: COMMERCIAL

## 2020-01-16 VITALS
SYSTOLIC BLOOD PRESSURE: 133 MMHG | BODY MASS INDEX: 28.17 KG/M2 | DIASTOLIC BLOOD PRESSURE: 85 MMHG | WEIGHT: 159 LBS | HEART RATE: 86 BPM | HEIGHT: 63 IN

## 2020-01-16 DIAGNOSIS — S83.232A COMPLEX TEAR OF MEDIAL MENISCUS, CURRENT INJURY, LEFT KNEE, INITIAL ENCOUNTER: ICD-10-CM

## 2020-01-16 DIAGNOSIS — S83.512A SPRAIN OF ANTERIOR CRUCIATE LIGAMENT OF LEFT KNEE, INITIAL ENCOUNTER: ICD-10-CM

## 2020-01-16 PROCEDURE — 99203 OFFICE O/P NEW LOW 30 MIN: CPT

## 2020-01-22 PROBLEM — S83.512A CHRONIC RUPTURE OF ANTERIOR CRUCIATE LIGAMENT OF LEFT KNEE: Status: ACTIVE | Noted: 2020-01-22

## 2020-01-22 PROBLEM — S83.232A COMPLEX TEAR OF MEDIAL MENISCUS OF LEFT KNEE, UNSPECIFIED WHETHER OLD OR CURRENT TEAR, INITIAL ENCOUNTER: Status: ACTIVE | Noted: 2020-01-22

## 2020-01-27 ENCOUNTER — APPOINTMENT (OUTPATIENT)
Dept: ORTHOPEDIC SURGERY | Facility: CLINIC | Age: 50
End: 2020-01-27
Payer: COMMERCIAL

## 2020-01-27 DIAGNOSIS — C49.9 MALIGNANT NEOPLASM OF CONNECTIVE AND SOFT TISSUE, UNSPECIFIED: ICD-10-CM

## 2020-01-27 PROCEDURE — 99214 OFFICE O/P EST MOD 30 MIN: CPT

## 2020-02-05 ENCOUNTER — OUTPATIENT (OUTPATIENT)
Dept: OUTPATIENT SERVICES | Facility: HOSPITAL | Age: 50
LOS: 1 days | End: 2020-02-05

## 2020-02-05 DIAGNOSIS — C49.9 MALIGNANT NEOPLASM OF CONNECTIVE AND SOFT TISSUE, UNSPECIFIED: ICD-10-CM

## 2020-02-05 DIAGNOSIS — Z85.830 PERSONAL HISTORY OF MALIGNANT NEOPLASM OF BONE: Chronic | ICD-10-CM

## 2020-02-05 DIAGNOSIS — Z90.13 ACQUIRED ABSENCE OF BILATERAL BREASTS AND NIPPLES: Chronic | ICD-10-CM

## 2020-02-17 ENCOUNTER — FORM ENCOUNTER (OUTPATIENT)
Age: 50
End: 2020-02-17

## 2020-02-18 ENCOUNTER — APPOINTMENT (OUTPATIENT)
Dept: MRI IMAGING | Facility: CLINIC | Age: 50
End: 2020-02-18
Payer: COMMERCIAL

## 2020-02-18 ENCOUNTER — OUTPATIENT (OUTPATIENT)
Dept: OUTPATIENT SERVICES | Facility: HOSPITAL | Age: 50
LOS: 1 days | End: 2020-02-18
Payer: COMMERCIAL

## 2020-02-18 DIAGNOSIS — Z90.13 ACQUIRED ABSENCE OF BILATERAL BREASTS AND NIPPLES: Chronic | ICD-10-CM

## 2020-02-18 DIAGNOSIS — Z85.830 PERSONAL HISTORY OF MALIGNANT NEOPLASM OF BONE: Chronic | ICD-10-CM

## 2020-02-18 DIAGNOSIS — C49.9 MALIGNANT NEOPLASM OF CONNECTIVE AND SOFT TISSUE, UNSPECIFIED: ICD-10-CM

## 2020-02-18 PROCEDURE — 73720 MRI LWR EXTREMITY W/O&W/DYE: CPT | Mod: 26,LT

## 2020-03-02 ENCOUNTER — APPOINTMENT (OUTPATIENT)
Dept: ORTHOPEDIC SURGERY | Facility: CLINIC | Age: 50
End: 2020-03-02
Payer: COMMERCIAL

## 2020-03-02 VITALS
DIASTOLIC BLOOD PRESSURE: 86 MMHG | SYSTOLIC BLOOD PRESSURE: 126 MMHG | HEIGHT: 62.5 IN | WEIGHT: 160 LBS | BODY MASS INDEX: 28.71 KG/M2 | HEART RATE: 91 BPM

## 2020-03-02 PROCEDURE — 99213 OFFICE O/P EST LOW 20 MIN: CPT

## 2020-03-26 ENCOUNTER — APPOINTMENT (OUTPATIENT)
Dept: ORTHOPEDIC SURGERY | Facility: CLINIC | Age: 50
End: 2020-03-26

## 2020-05-07 ENCOUNTER — APPOINTMENT (OUTPATIENT)
Dept: ORTHOPEDIC SURGERY | Facility: CLINIC | Age: 50
End: 2020-05-07

## 2021-11-30 NOTE — ED ADULT NURSE NOTE - CHPI ED SYMPTOMS NEG
Educational attainment/Vocabulary Educational attainment/Vocabulary Educational attainment/Vocabulary Educational attainment/Vocabulary Educational attainment/Vocabulary Educational attainment/Vocabulary Educational attainment/Vocabulary Educational attainment/Vocabulary Educational attainment/Vocabulary no abdominal distension/no blood in stool/no diarrhea/no dysuria/no fever/no hematuria/no vomiting/no burning urination Educational attainment/Vocabulary

## 2021-12-08 NOTE — PROGRESS NOTE ADULT - SUBJECTIVE AND OBJECTIVE BOX
Patient is a 48y old  Female who presents with a chief complaint of acute cholecystitis (28 Sep 2018 09:47)      SUBJECTIVE / OVERNIGHT EVENTS:  Abdominal pain persists is better than yesterday.  Afebrile.  MRI did not reveal any source of pain in the gallbladder.  Review of Systems:   CONSTITUTIONAL: No fever, weight loss, or fatigue  EYES: No eye pain, visual disturbances, or discharge  ENMT:  No difficulty hearing, tinnitus, vertigo; No sinus or throat pain  NECK: No pain or stiffness  BREASTS: No pain, masses, or nipple discharge  RESPIRATORY: No cough, wheezing, chills or hemoptysis; No shortness of breath  CARDIOVASCULAR: No chest pain, palpitations, dizziness, or leg swelling  GASTROINTESTINAL: No abdominal or epigastric pain. No nausea, vomiting, or hematemesis; No diarrhea or constipation. No melena or hematochezia.  GENITOURINARY: No dysuria, frequency, hematuria, or incontinence  NEUROLOGICAL: No headaches, memory loss, loss of strength, numbness, or tremors  SKIN: No itching, burning, rashes, or lesions   LYMPH NODES: No enlarged glands  ENDOCRINE: No heat or cold intolerance; No hair loss  MUSCULOSKELETAL: No joint pain or swelling; No muscle, back, or extremity pain  PSYCHIATRIC: No depression, anxiety, mood swings, or difficulty sleeping  HEME/LYMPH: No easy bruising, or bleeding gums  ALLERY AND IMMUNOLOGIC: No hives or eczema    MEDICATIONS  (STANDING):  ALPRAZolam 0.25 milliGRAM(s) Oral three times a day  dextrose 5% + sodium chloride 0.45% with potassium chloride 20 mEq/L 1000 milliLiter(s) (100 mL/Hr) IV Continuous <Continuous>  enoxaparin Injectable 40 milliGRAM(s) SubCutaneous daily  escitalopram 20 milliGRAM(s) Oral daily  influenza   Vaccine 0.5 milliLiter(s) IntraMuscular once  losartan 50 milliGRAM(s) Oral daily  melatonin 3 milliGRAM(s) Oral at bedtime  nicotine -   7 mG/24Hr(s) Patch 1 patch Transdermal daily  pantoprazole    Tablet 40 milliGRAM(s) Oral before breakfast  sodium phosphate IVPB 30 milliMole(s) IV Intermittent once    MEDICATIONS  (PRN):  aluminum hydroxide/magnesium hydroxide/simethicone Suspension 30 milliLiter(s) Oral every 4 hours PRN Dyspepsia      PHYSICAL EXAM:  Vital Signs Last 24 Hrs  T(C): 36.9 (28 Sep 2018 09:20), Max: 37.1 (27 Sep 2018 14:08)  T(F): 98.4 (28 Sep 2018 09:20), Max: 98.7 (27 Sep 2018 14:08)  HR: 65 (28 Sep 2018 09:20) (65 - 75)  BP: 134/88 (28 Sep 2018 09:20) (125/76 - 146/88)  BP(mean): --  RR: 18 (28 Sep 2018 09:20) (18 - 18)  SpO2: 98% (28 Sep 2018 09:20) (96% - 98%)  I&O's Summary    27 Sep 2018 07:01  -  28 Sep 2018 07:00  --------------------------------------------------------  IN: 1200 mL / OUT: 0 mL / NET: 1200 mL    28 Sep 2018 07:01  -  28 Sep 2018 13:59  --------------------------------------------------------  IN: 0 mL / OUT: 0 mL / NET: 0 mL      GENERAL: NAD, well-developed  HEAD:  Atraumatic, Normocephalic  EYES: EOMI, PERRLA, conjunctiva and sclera clear  NECK: Supple, No JVD  CHEST/LUNG: Clear to auscultation bilaterally; No wheeze  HEART: Regular rate and rhythm; No murmurs, rubs, or gallops  ABDOMEN: Soft, Nontender, Nondistended; Bowel sounds present  EXTREMITIES:  2+ Peripheral Pulses, No clubbing, cyanosis, or edema  PSYCH: AAOx3  NEUROLOGY: non-focal  SKIN: No rashes or lesions    LABS:  CAPILLARY BLOOD GLUCOSE                              11.7   7.30  )-----------( 217      ( 28 Sep 2018 08:35 )             34.3     09-28    137  |  103  |  <4<L>  ----------------------------<  105<H>  3.8   |  21<L>  |  0.66    Ca    8.9      28 Sep 2018 07:10  Phos  1.8     09-28  Mg     2.2     09-28    TPro  6.4  /  Alb  3.5  /  TBili  0.1<L>  /  DBili  <0.1  /  AST  26  /  ALT  11  /  AlkPhos  80  09-27              RADIOLOGY & ADDITIONAL TESTS:    Imaging Personally Reviewed:    Consultant(s) Notes Reviewed:      Care Discussed with Consultants/Other Providers: Oculoplastic Surgeon Procedure Text (A): After obtaining clear surgical margins the patient was sent to oculoplastics for surgical repair.  The patient understands they will receive post-surgical care and follow-up from the referring physician's office.

## 2022-01-31 NOTE — DISCHARGE NOTE ADULT - NS AS DC AMI YN
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 Following up with gyne  - Carotid ultrasound normal   no

## 2022-03-30 NOTE — PATIENT PROFILE ADULT. - NS PRO OT REFERRAL QUES 2 YN
Consent: Prior to the procedure consent obtained, risks reviewed including but not limited to crusting, scabbing, blistering, scarring, darker or lighter pigmentary change, incidental hair removal, bruising, and/or incomplete removal. Wavelength (Include Units): SVL Price (Use Numbers Only, No Special Characters Or $): 200 Fluence (Include Units): 18 Add Additional Location: No Ipl Type: IPL Post-Care Instructions: I reviewed with the patient in detail post-care instructions. Patient should stay away from the sun and wear sun protection until treated areas are fully healed. Pulse Energy (Include Units): 12 Pre-Procedure Care: Prior to the procedure the patient and all present had protective eyewear in place and a warning sign was placed on the door. Indication: Actinic Damage Show Price In Visit Note: Yes Eye Protection: Laser Aid Wavelength (Include Units):  Detail Level: Zone no

## 2022-05-05 ENCOUNTER — RESULT REVIEW (OUTPATIENT)
Age: 52
End: 2022-05-05

## 2022-07-23 NOTE — ED ADULT NURSE NOTE - NSSISCREENINGQ1_ED_A_ED
FOLLOW UP WITH YOUR EYE DOCTOR ON MONDAY AS SCHEDULED.     CONTINUE ANTIBIOTICS UNTIL INSTRUCTED BY YOUR EYE DOCTOR.     FOLLOW UP WITH YOUR PMD/GI ABOUT YOUR IBS-C.     RETURN TO ER FOR VISION CHANGES, FEVER, CHILLS, INCREASED PAIN, ANY OTHER CONCERNING SYMPTOMS.
No

## 2023-05-11 NOTE — ED PROVIDER NOTE - RESPIRATORY, MLM
----- Message from Renee Gomez sent at 5/11/2023  3:01 PM EDT -----  Regarding: FW: Diarrhea  Contact: [phone]        ----- Message -----  From: Sprigler, Joy C  Sent: 5/11/2023   9:06 AM EDT  To: [facility] Pool  Subject: Diarrhea                                         I have been dealing with diarrhea off and on for nearly 3 weeks, have quit taking B D meds for over a week , thought that might be causing the problem.Yesterday seemed much better, but back with a vengeance today.What can Hugh? cell   Thank you      She is not having fever or chills.  She does have some intermittent cramping.  She is not really having abdominal pain.  She has had no blood in the bowel movements.  I am going to give her a little bit of glycopyrrolate but if her symptoms persist advised an office visit  
Breath sounds clear and equal bilaterally.

## 2023-07-15 NOTE — DISCHARGE NOTE ADULT - MEDICATION SUMMARY - MEDICATIONS TO TAKE
I will START or STAY ON the medications listed below when I get home from the hospital:    Tylenol 325 mg oral capsule  -- 2 tab(s) by mouth every 4 hours, As Needed - for mild pain  -- Indication: For pain    oxyCODONE-acetaminophen 5 mg-325 mg oral tablet  -- 1-2 tab(s) by mouth every 4-6 hours, As needed, Moderate to Severe Pain MDD:6  -- Indication: For pain    Diovan 80 mg oral capsule  -- 1 cap(s) by mouth once a day  -- Indication: For HTN (hypertension)    aluminum hydroxide-magnesium hydroxide 200 mg-200 mg/5 mL oral suspension  -- 30 milliliter(s) by mouth every 4 hours, As needed, Dyspepsia  -- Indication: For GERD (gastroesophageal reflux disease)    Zofran ODT 4 mg oral tablet, disintegrating  -- 1 tab(s) by mouth 3 times a day  -- Indication: For nausea    PriLOSEC 20 mg oral delayed release capsule  -- 1 cap(s) by mouth once a day  -- Indication: For GERD (gastroesophageal reflux disease) Hatchet Flap Text: The defect edges were debeveled with a #15 scalpel blade. Given the location of the defect, shape of the defect and the proximity to free margins a hatchet flap was deemed most appropriate. Using a sterile surgical marker, an appropriate hatchet flap was drawn incorporating the defect and placing the expected incisions within the relaxed skin tension lines where possible. The area thus outlined was incised deep to adipose tissue with a #15 scalpel blade. The skin margins were undermined to an appropriate distance in all directions utilizing iris scissors. Following this, the designed flap was carried over into the primary defect and sutured into place.

## 2023-07-27 NOTE — ED PROVIDER NOTE - CADM POA URETHRAL CATHETER
Partner unable to be tested due to lack of insurance/cost of test.  - screen in [place] screens for sickle cell disease. Pediatrician should be advised.    -Needs UCx each trimester  -2nd tri UCx negative No

## 2023-11-21 RX ORDER — ONDANSETRON 8 MG/1
1 TABLET, FILM COATED ORAL
Qty: 0 | Refills: 0 | DISCHARGE

## 2023-11-21 RX ORDER — VALSARTAN 80 MG/1
1 TABLET ORAL
Qty: 0 | Refills: 0 | DISCHARGE

## 2023-11-21 RX ORDER — OMEPRAZOLE 10 MG/1
0 CAPSULE, DELAYED RELEASE ORAL
Qty: 0 | Refills: 0 | DISCHARGE

## 2023-11-21 RX ORDER — ONDANSETRON 8 MG/1
0 TABLET, FILM COATED ORAL
Qty: 0 | Refills: 0 | DISCHARGE

## 2023-11-21 RX ORDER — OMEPRAZOLE 10 MG/1
1 CAPSULE, DELAYED RELEASE ORAL
Qty: 0 | Refills: 0 | DISCHARGE

## 2023-11-21 RX ORDER — VALSARTAN 80 MG/1
0 TABLET ORAL
Qty: 0 | Refills: 0 | DISCHARGE

## 2023-11-21 RX ORDER — ACETAMINOPHEN 500 MG
2 TABLET ORAL
Qty: 0 | Refills: 0 | DISCHARGE

## 2023-11-21 RX ORDER — OLMESARTAN MEDOXOMIL 5 MG/1
1 TABLET, FILM COATED ORAL
Qty: 0 | Refills: 0 | DISCHARGE

## 2025-03-19 NOTE — ED PROVIDER NOTE - NS ED MD EM SELECTION
Problem: Discharge Planning  Goal: Discharge to home or other facility with appropriate resources  3/19/2025 0942 by Alisha Junior RN  Outcome: Progressing  3/19/2025 0019 by Ronna Holly RN  Outcome: Progressing  3/18/2025 2133 by Raffi Hyde RN  Outcome: Progressing     Problem: Pain  Goal: Verbalizes/displays adequate comfort level or baseline comfort level  3/19/2025 0942 by Alisha Junior RN  Outcome: Progressing  3/19/2025 0019 by Ronna Holly RN  Outcome: Progressing  3/18/2025 2133 by Raffi Hyde RN  Outcome: Progressing     Problem: Safety - Adult  Goal: Free from fall injury  3/19/2025 0942 by Alisha Junior RN  Outcome: Progressing  3/19/2025 0019 by Ronna Holly RN  Outcome: Progressing  3/18/2025 2133 by Raffi Hyde RN  Outcome: Progressing     Problem: ABCDS Injury Assessment  Goal: Absence of physical injury  3/19/2025 0942 by Alisha Junior RN  Outcome: Progressing  3/19/2025 0019 by Ronna Holly RN  Outcome: Progressing  3/18/2025 2133 by Raffi Hyde RN  Outcome: Progressing     Problem: Skin/Tissue Integrity  Goal: Skin integrity remains intact  Description: 1.  Monitor for areas of redness and/or skin breakdown  2.  Assess vascular access sites hourly  3.  Every 4-6 hours minimum:  Change oxygen saturation probe site  4.  Every 4-6 hours:  If on nasal continuous positive airway pressure, respiratory therapy assess nares and determine need for appliance change or resting period  3/19/2025 0942 by Alisha Junior RN  Outcome: Progressing  3/19/2025 0019 by Ronna Holly RN  Outcome: Progressing  3/18/2025 2133 by Raffi Hyde RN  Outcome: Progressing      [id number] Detailed

## 2025-04-08 NOTE — ED PROVIDER NOTE - PROGRESS NOTE DETAILS
Surgery at bedside evaluating patient. Patient reports mild abdominal pain.  Awaiting surgical evaluation for cholecystitis.  Surgery contacted and report they will see patient. Spoke with Dr Elder, surgical attending, who requests HIDA scan, as he is concerned that the CT and US results (he has reviewed both imaging studies) are equivocal, and would like additional confirmatory study.  [facility]oke with nuclear medicine; sending for patient now for HIDA scan. -[facility] patient to go back to [facility] for rest of study.  surgery attg down and is requesting admission. -miguelina